# Patient Record
Sex: MALE | Race: WHITE | HISPANIC OR LATINO | URBAN - METROPOLITAN AREA
[De-identification: names, ages, dates, MRNs, and addresses within clinical notes are randomized per-mention and may not be internally consistent; named-entity substitution may affect disease eponyms.]

---

## 2020-02-16 ENCOUNTER — APPOINTMENT (OUTPATIENT)
Dept: RADIOLOGY | Facility: MEDICAL CENTER | Age: 20
End: 2020-02-16
Attending: EMERGENCY MEDICINE
Payer: COMMERCIAL

## 2020-02-16 ENCOUNTER — HOSPITAL ENCOUNTER (EMERGENCY)
Facility: MEDICAL CENTER | Age: 20
End: 2020-02-16
Attending: EMERGENCY MEDICINE
Payer: COMMERCIAL

## 2020-02-16 VITALS
TEMPERATURE: 98 F | BODY MASS INDEX: 25.95 KG/M2 | OXYGEN SATURATION: 100 % | HEART RATE: 81 BPM | DIASTOLIC BLOOD PRESSURE: 72 MMHG | SYSTOLIC BLOOD PRESSURE: 134 MMHG | HEIGHT: 67 IN | WEIGHT: 165.34 LBS | RESPIRATION RATE: 20 BRPM

## 2020-02-16 DIAGNOSIS — F10.929 ALCOHOLIC INTOXICATION WITH COMPLICATION (HCC): ICD-10-CM

## 2020-02-16 DIAGNOSIS — S01.81XA FACIAL LACERATION, INITIAL ENCOUNTER: ICD-10-CM
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
## 2020-02-16 DIAGNOSIS — S09.90XA CLOSED HEAD INJURY, INITIAL ENCOUNTER: ICD-10-CM

## 2020-02-16 LAB
ABO + RH BLD: NORMAL
ABO GROUP BLD: NORMAL
ALBUMIN SERPL BCP-MCNC: 4.5 G/DL (ref 3.2–4.9)
ALBUMIN/GLOB SERPL: 1.5 G/DL
ALP SERPL-CCNC: 51 U/L (ref 30–99)
ALT SERPL-CCNC: 13 U/L (ref 2–50)
ANION GAP SERPL CALC-SCNC: 12 MMOL/L (ref 0–11.9)
APTT PPP: 21.5 SEC (ref 24.7–36)
AST SERPL-CCNC: 21 U/L (ref 12–45)
BILIRUB SERPL-MCNC: 0.4 MG/DL (ref 0.1–1.5)
BLD GP AB SCN SERPL QL: NORMAL
BUN SERPL-MCNC: 10 MG/DL (ref 8–22)
CALCIUM SERPL-MCNC: 9.4 MG/DL (ref 8.5–10.5)
CFT BLD TEG: 4.3 MIN (ref 5–10)
CHLORIDE SERPL-SCNC: 107 MMOL/L (ref 96–112)
CLOT ANGLE BLD TEG: 66.7 DEGREES (ref 53–72)
CLOT LYSIS 30M P MA LENFR BLD TEG: 0 % (ref 0–8)
CO2 SERPL-SCNC: 24 MMOL/L (ref 20–33)
CREAT SERPL-MCNC: 0.99 MG/DL (ref 0.5–1.4)
CT.EXTRINSIC BLD ROTEM: 1.8 MIN (ref 1–3)
ERYTHROCYTE [DISTWIDTH] IN BLOOD BY AUTOMATED COUNT: 43.8 FL (ref 35.9–50)
ETHANOL BLD-MCNC: 0.15 G/DL
GLOBULIN SER CALC-MCNC: 3.1 G/DL (ref 1.9–3.5)
GLUCOSE SERPL-MCNC: 122 MG/DL (ref 65–99)
HCT VFR BLD AUTO: 46.1 % (ref 42–52)
HGB BLD-MCNC: 15.5 G/DL (ref 14–18)
INR PPP: 0.96 (ref 0.87–1.13)
LACTATE BLD-SCNC: 1.9 MMOL/L (ref 0.5–2)
LACTATE BLD-SCNC: 4.6 MMOL/L (ref 0.5–2)
MCF BLD TEG: 64.2 MM (ref 50–70)
MCH RBC QN AUTO: 32.1 PG (ref 27–33)
MCHC RBC AUTO-ENTMCNC: 33.6 G/DL (ref 33.7–35.3)
MCV RBC AUTO: 95.4 FL (ref 81.4–97.8)
PA AA BLD-ACNC: 65.8 %
PA ADP BLD-ACNC: 64.8 %
PLATELET # BLD AUTO: 238 K/UL (ref 164–446)
PMV BLD AUTO: 9.8 FL (ref 9–12.9)
POTASSIUM SERPL-SCNC: 3.8 MMOL/L (ref 3.6–5.5)
PROT SERPL-MCNC: 7.6 G/DL (ref 6–8.2)
PROTHROMBIN TIME: 13 SEC (ref 12–14.6)
RBC # BLD AUTO: 4.83 M/UL (ref 4.7–6.1)
RH BLD: NORMAL
SODIUM SERPL-SCNC: 143 MMOL/L (ref 135–145)
TEG ALGORITHM TGALG: ABNORMAL
WBC # BLD AUTO: 8.4 K/UL (ref 4.8–10.8)

## 2020-02-16 PROCEDURE — 85384 FIBRINOGEN ACTIVITY: CPT

## 2020-02-16 PROCEDURE — 80307 DRUG TEST PRSMV CHEM ANLYZR: CPT

## 2020-02-16 PROCEDURE — 85610 PROTHROMBIN TIME: CPT

## 2020-02-16 PROCEDURE — 99285 EMERGENCY DEPT VISIT HI MDM: CPT

## 2020-02-16 PROCEDURE — 80053 COMPREHEN METABOLIC PANEL: CPT

## 2020-02-16 PROCEDURE — 305308 HCHG STAPLER,SKIN,DISP.

## 2020-02-16 PROCEDURE — 90715 TDAP VACCINE 7 YRS/> IM: CPT | Performed by: EMERGENCY MEDICINE

## 2020-02-16 PROCEDURE — 85730 THROMBOPLASTIN TIME PARTIAL: CPT

## 2020-02-16 PROCEDURE — 90471 IMMUNIZATION ADMIN: CPT

## 2020-02-16 PROCEDURE — 72128 CT CHEST SPINE W/O DYE: CPT

## 2020-02-16 PROCEDURE — 86901 BLOOD TYPING SEROLOGIC RH(D): CPT

## 2020-02-16 PROCEDURE — 70486 CT MAXILLOFACIAL W/O DYE: CPT

## 2020-02-16 PROCEDURE — 700111 HCHG RX REV CODE 636 W/ 250 OVERRIDE (IP): Performed by: EMERGENCY MEDICINE

## 2020-02-16 PROCEDURE — 700102 HCHG RX REV CODE 250 W/ 637 OVERRIDE(OP): Performed by: EMERGENCY MEDICINE

## 2020-02-16 PROCEDURE — 86850 RBC ANTIBODY SCREEN: CPT

## 2020-02-16 PROCEDURE — 304999 HCHG REPAIR-SIMPLE/INTERMED LEVEL 1

## 2020-02-16 PROCEDURE — 700101 HCHG RX REV CODE 250

## 2020-02-16 PROCEDURE — 72170 X-RAY EXAM OF PELVIS: CPT

## 2020-02-16 PROCEDURE — 83605 ASSAY OF LACTIC ACID: CPT

## 2020-02-16 PROCEDURE — 303747 HCHG EXTRA SUTURE

## 2020-02-16 PROCEDURE — 72131 CT LUMBAR SPINE W/O DYE: CPT

## 2020-02-16 PROCEDURE — 85347 COAGULATION TIME ACTIVATED: CPT

## 2020-02-16 PROCEDURE — 85027 COMPLETE CBC AUTOMATED: CPT

## 2020-02-16 PROCEDURE — 86900 BLOOD TYPING SEROLOGIC ABO: CPT

## 2020-02-16 PROCEDURE — 72125 CT NECK SPINE W/O DYE: CPT

## 2020-02-16 PROCEDURE — 70450 CT HEAD/BRAIN W/O DYE: CPT

## 2020-02-16 PROCEDURE — 71045 X-RAY EXAM CHEST 1 VIEW: CPT

## 2020-02-16 PROCEDURE — 700105 HCHG RX REV CODE 258: Performed by: EMERGENCY MEDICINE

## 2020-02-16 PROCEDURE — 74177 CT ABD & PELVIS W/CONTRAST: CPT

## 2020-02-16 PROCEDURE — 700117 HCHG RX CONTRAST REV CODE 255: Performed by: EMERGENCY MEDICINE

## 2020-02-16 PROCEDURE — A9270 NON-COVERED ITEM OR SERVICE: HCPCS | Performed by: EMERGENCY MEDICINE

## 2020-02-16 PROCEDURE — 700111 HCHG RX REV CODE 636 W/ 250 OVERRIDE (IP)

## 2020-02-16 PROCEDURE — 305948 HCHG GREEN TRAUMA ACT PRE-NOTIFY NO CC

## 2020-02-16 PROCEDURE — 85576 BLOOD PLATELET AGGREGATION: CPT

## 2020-02-16 RX ORDER — LIDOCAINE HYDROCHLORIDE 10 MG/ML
20 INJECTION, SOLUTION INFILTRATION; PERINEURAL ONCE
Status: COMPLETED | OUTPATIENT
Start: 2020-02-16 | End: 2020-02-16

## 2020-02-16 RX ORDER — SODIUM CHLORIDE, SODIUM LACTATE, POTASSIUM CHLORIDE, CALCIUM CHLORIDE 600; 310; 30; 20 MG/100ML; MG/100ML; MG/100ML; MG/100ML
2000 INJECTION, SOLUTION INTRAVENOUS ONCE
Status: COMPLETED | OUTPATIENT
Start: 2020-02-16 | End: 2020-02-16

## 2020-02-16 RX ORDER — LIDOCAINE HYDROCHLORIDE 10 MG/ML
INJECTION, SOLUTION INFILTRATION; PERINEURAL
Status: COMPLETED
Start: 2020-02-16 | End: 2020-02-16

## 2020-02-16 RX ORDER — HYDROCODONE BITARTRATE AND ACETAMINOPHEN 5; 325 MG/1; MG/1
1 TABLET ORAL EVERY 6 HOURS PRN
Qty: 7 TAB | Refills: 0 | Status: SHIPPED | OUTPATIENT
Start: 2020-02-16 | End: 2020-02-19

## 2020-02-16 RX ORDER — CEPHALEXIN 500 MG/1
500 CAPSULE ORAL ONCE
Status: COMPLETED | OUTPATIENT
Start: 2020-02-16 | End: 2020-02-16

## 2020-02-16 RX ORDER — CEPHALEXIN 500 MG/1
500 CAPSULE ORAL 4 TIMES DAILY
Qty: 28 CAP | Refills: 0 | Status: SHIPPED | OUTPATIENT
Start: 2020-02-16

## 2020-02-16 RX ADMIN — FENTANYL CITRATE 100 MCG: 0.05 INJECTION, SOLUTION INTRAMUSCULAR; INTRAVENOUS at 05:48

## 2020-02-16 RX ADMIN — LIDOCAINE HYDROCHLORIDE 20 ML: 10 INJECTION, SOLUTION INFILTRATION; PERINEURAL at 05:00

## 2020-02-16 RX ADMIN — CEPHALEXIN 500 MG: 500 CAPSULE ORAL at 06:59

## 2020-02-16 RX ADMIN — CLOSTRIDIUM TETANI TOXOID ANTIGEN (FORMALDEHYDE INACTIVATED), CORYNEBACTERIUM DIPHTHERIAE TOXOID ANTIGEN (FORMALDEHYDE INACTIVATED), BORDETELLA PERTUSSIS TOXOID ANTIGEN (GLUTARALDEHYDE INACTIVATED), BORDETELLA PERTUSSIS FILAMENTOUS HEMAGGLUTININ ANTIGEN (FORMALDEHYDE INACTIVATED), BORDETELLA PERTUSSIS PERTACTIN ANTIGEN, AND BORDETELLA PERTUSSIS FIMBRIAE 2/3 ANTIGEN 0.5 ML: 5; 2; 2.5; 5; 3; 5 INJECTION, SUSPENSION INTRAMUSCULAR at 04:46

## 2020-02-16 RX ADMIN — SODIUM CHLORIDE, POTASSIUM CHLORIDE, SODIUM LACTATE AND CALCIUM CHLORIDE 2000 ML: 600; 310; 30; 20 INJECTION, SOLUTION INTRAVENOUS at 05:00

## 2020-02-16 RX ADMIN — IOHEXOL 100 ML: 350 INJECTION, SOLUTION INTRAVENOUS at 04:07
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
## 2020-02-16 NOTE — ED NOTES
Pt cleaned and provided with fresh gown. Pt has numerous R sided facial Lacs to Chin, nose, cheek, and forehead

## 2020-02-16 NOTE — ED NOTES
Pt cleared for discharge. AVS and RX instructions given.  Pt understands follow up and will follow up as directed. Pt ambulatory to lobby to return home

## 2021-08-16 ENCOUNTER — APPOINTMENT (OUTPATIENT)
Dept: RADIOLOGY | Facility: MEDICAL CENTER | Age: 21
DRG: 957 | End: 2021-08-16
Attending: SURGERY

## 2021-08-16 ENCOUNTER — APPOINTMENT (OUTPATIENT)
Dept: RADIOLOGY | Facility: MEDICAL CENTER | Age: 21
DRG: 957 | End: 2021-08-16
Attending: PHYSICIAN ASSISTANT

## 2021-08-16 ENCOUNTER — HOSPITAL ENCOUNTER (INPATIENT)
Facility: MEDICAL CENTER | Age: 21
LOS: 8 days | DRG: 957 | End: 2021-08-24
Attending: SURGERY | Admitting: SURGERY
Payer: COMMERCIAL

## 2021-08-16 ENCOUNTER — ANESTHESIA (OUTPATIENT)
Dept: SURGERY | Facility: MEDICAL CENTER | Age: 21
DRG: 957 | End: 2021-08-16

## 2021-08-16 ENCOUNTER — APPOINTMENT (OUTPATIENT)
Dept: RADIOLOGY | Facility: MEDICAL CENTER | Age: 21
DRG: 957 | End: 2021-08-16
Attending: ORTHOPAEDIC SURGERY

## 2021-08-16 ENCOUNTER — ANESTHESIA EVENT (OUTPATIENT)
Dept: SURGERY | Facility: MEDICAL CENTER | Age: 21
DRG: 957 | End: 2021-08-16

## 2021-08-16 ENCOUNTER — APPOINTMENT (OUTPATIENT)
Dept: RADIOLOGY | Facility: MEDICAL CENTER | Age: 21
DRG: 957 | End: 2021-08-16
Attending: NURSE PRACTITIONER

## 2021-08-16 DIAGNOSIS — S32.810A MULTIPLE CLOSED FRACTURES OF PELVIS WITH DISRUPTION OF PELVIC RING, INITIAL ENCOUNTER (HCC): ICD-10-CM

## 2021-08-16 PROBLEM — S73.001A: Status: ACTIVE | Noted: 2021-08-16

## 2021-08-16 PROBLEM — S82.201B TIBIA/FIBULA FRACTURE, RIGHT, OPEN TYPE I OR II, INITIAL ENCOUNTER: Status: ACTIVE | Noted: 2021-08-16

## 2021-08-16 PROBLEM — S72.002A CLOSED FRACTURE OF NECK OF LEFT FEMUR (HCC): Status: ACTIVE | Noted: 2021-08-16

## 2021-08-16 PROBLEM — T14.90XA TRAUMA: Status: ACTIVE | Noted: 2021-08-16

## 2021-08-16 PROBLEM — F10.929 ACUTE ALCOHOL INTOXICATION (HCC): Status: ACTIVE | Noted: 2021-08-16

## 2021-08-16 PROBLEM — Z11.52 ENCOUNTER FOR SCREENING FOR COVID-19: Status: ACTIVE | Noted: 2021-08-16

## 2021-08-16 PROBLEM — S82.401B TIBIA/FIBULA FRACTURE, RIGHT, OPEN TYPE I OR II, INITIAL ENCOUNTER: Status: ACTIVE | Noted: 2021-08-16

## 2021-08-16 PROBLEM — S73.004A HIP DISLOCATION, RIGHT, INITIAL ENCOUNTER (HCC): Status: ACTIVE | Noted: 2021-08-16

## 2021-08-16 PROBLEM — Z53.09 CONTRAINDICATION TO DEEP VEIN THROMBOSIS (DVT) PROPHYLAXIS: Status: ACTIVE | Noted: 2021-08-16

## 2021-08-16 PROBLEM — M25.461 KNEE EFFUSION, RIGHT: Status: ACTIVE | Noted: 2021-08-16

## 2021-08-16 PROBLEM — J69.0 ASPIRATION PNEUMONIA (HCC): Status: ACTIVE | Noted: 2021-08-16

## 2021-08-16 PROBLEM — J96.90 RESPIRATORY FAILURE FOLLOWING TRAUMA (HCC): Status: ACTIVE | Noted: 2021-08-16

## 2021-08-16 LAB
ABO GROUP BLD: NORMAL
ALBUMIN SERPL BCP-MCNC: 4.1 G/DL (ref 3.2–4.9)
ALBUMIN/GLOB SERPL: 1.6 G/DL
ALP SERPL-CCNC: 63 U/L (ref 30–99)
ALT SERPL-CCNC: 52 U/L (ref 2–50)
ANION GAP SERPL CALC-SCNC: 13 MMOL/L (ref 7–16)
APTT PPP: 24.8 SEC (ref 24.7–36)
AST SERPL-CCNC: 46 U/L (ref 12–45)
BASE EXCESS BLDA CALC-SCNC: -4 MMOL/L (ref -4–3)
BILIRUB SERPL-MCNC: 0.3 MG/DL (ref 0.1–1.5)
BLD GP AB SCN SERPL QL: NORMAL
BODY TEMPERATURE: ABNORMAL DEGREES
BREATHS SETTING VENT: 16
BUN SERPL-MCNC: 7 MG/DL (ref 8–22)
CALCIUM SERPL-MCNC: 8.5 MG/DL (ref 8.5–10.5)
CHLORIDE SERPL-SCNC: 111 MMOL/L (ref 96–112)
CO2 BLDA-SCNC: 24 MMOL/L (ref 20–33)
CO2 SERPL-SCNC: 24 MMOL/L (ref 20–33)
CREAT SERPL-MCNC: 1.22 MG/DL (ref 0.5–1.4)
DELSYS IDSYS: ABNORMAL
END TIDAL CARBON DIOXIDE IECO2: 40 MMHG
ERYTHROCYTE [DISTWIDTH] IN BLOOD BY AUTOMATED COUNT: 45 FL (ref 35.9–50)
ETHANOL BLD-MCNC: 263.1 MG/DL (ref 0–10)
GLOBULIN SER CALC-MCNC: 2.6 G/DL (ref 1.9–3.5)
GLUCOSE SERPL-MCNC: 155 MG/DL (ref 65–99)
HCO3 BLDA-SCNC: 22.6 MMOL/L (ref 17–25)
HCT VFR BLD AUTO: 45.3 % (ref 42–52)
HGB BLD-MCNC: 15 G/DL (ref 14–18)
HOROWITZ INDEX BLDA+IHG-RTO: 364 MM[HG]
INR PPP: 1.17 (ref 0.87–1.13)
MAGNESIUM SERPL-MCNC: 2 MG/DL (ref 1.5–2.5)
MCH RBC QN AUTO: 31.6 PG (ref 27–33)
MCHC RBC AUTO-ENTMCNC: 33.1 G/DL (ref 33.7–35.3)
MCV RBC AUTO: 95.6 FL (ref 81.4–97.8)
MODE IMODE: ABNORMAL
O2/TOTAL GAS SETTING VFR VENT: 50 %
PCO2 BLDA: 47.9 MMHG (ref 26–37)
PCO2 TEMP ADJ BLDA: 45.6 MMHG (ref 26–37)
PEEP END EXPIRATORY PRESSURE IPEEP: 8 CMH20
PH BLDA: 7.28 [PH] (ref 7.4–7.5)
PH TEMP ADJ BLDA: 7.3 [PH] (ref 7.4–7.5)
PHOSPHATE SERPL-MCNC: 3.3 MG/DL (ref 2.5–4.5)
PLATELET # BLD AUTO: 246 K/UL (ref 164–446)
PMV BLD AUTO: 9.9 FL (ref 9–12.9)
PO2 BLDA: 182 MMHG (ref 64–87)
PO2 TEMP ADJ BLDA: 176 MMHG (ref 64–87)
POTASSIUM SERPL-SCNC: 3.7 MMOL/L (ref 3.6–5.5)
PROT SERPL-MCNC: 6.7 G/DL (ref 6–8.2)
PROTHROMBIN TIME: 14.6 SEC (ref 12–14.6)
RBC # BLD AUTO: 4.74 M/UL (ref 4.7–6.1)
RH BLD: NORMAL
SAO2 % BLDA: 99 % (ref 93–99)
SARS-COV+SARS-COV-2 AG RESP QL IA.RAPID: NOTDETECTED
SODIUM SERPL-SCNC: 148 MMOL/L (ref 135–145)
SPECIMEN DRAWN FROM PATIENT: ABNORMAL
SPECIMEN SOURCE: NORMAL
TIDAL VOLUME IVT: 430 ML
TRIGL SERPL-MCNC: 177 MG/DL (ref 0–149)
TRIGL SERPL-MCNC: 97 MG/DL (ref 0–149)
WBC # BLD AUTO: 8.8 K/UL (ref 4.8–10.8)

## 2021-08-16 PROCEDURE — 76705 ECHO EXAM OF ABDOMEN: CPT

## 2021-08-16 PROCEDURE — 0QS404Z REPOSITION RIGHT ACETABULUM WITH INTERNAL FIXATION DEVICE, OPEN APPROACH: ICD-10-PCS | Performed by: ORTHOPAEDIC SURGERY

## 2021-08-16 PROCEDURE — 700105 HCHG RX REV CODE 258: Performed by: ANESTHESIOLOGY

## 2021-08-16 PROCEDURE — 36600 WITHDRAWAL OF ARTERIAL BLOOD: CPT

## 2021-08-16 PROCEDURE — 160009 HCHG ANES TIME/MIN: Performed by: ORTHOPAEDIC SURGERY

## 2021-08-16 PROCEDURE — 71260 CT THORAX DX C+: CPT

## 2021-08-16 PROCEDURE — 700111 HCHG RX REV CODE 636 W/ 250 OVERRIDE (IP): Performed by: SURGERY

## 2021-08-16 PROCEDURE — 86901 BLOOD TYPING SEROLOGIC RH(D): CPT

## 2021-08-16 PROCEDURE — 700101 HCHG RX REV CODE 250: Performed by: PHARMACIST

## 2021-08-16 PROCEDURE — 83735 ASSAY OF MAGNESIUM: CPT

## 2021-08-16 PROCEDURE — 700101 HCHG RX REV CODE 250: Performed by: ORTHOPAEDIC SURGERY

## 2021-08-16 PROCEDURE — 72170 X-RAY EXAM OF PELVIS: CPT

## 2021-08-16 PROCEDURE — 700111 HCHG RX REV CODE 636 W/ 250 OVERRIDE (IP): Performed by: ORTHOPAEDIC SURGERY

## 2021-08-16 PROCEDURE — 72125 CT NECK SPINE W/O DYE: CPT

## 2021-08-16 PROCEDURE — 0BH18EZ INSERTION OF ENDOTRACHEAL AIRWAY INTO TRACHEA, VIA NATURAL OR ARTIFICIAL OPENING ENDOSCOPIC: ICD-10-PCS | Performed by: EMERGENCY MEDICINE

## 2021-08-16 PROCEDURE — 500367 HCHG DRAIN KIT, HEMOVAC: Performed by: ORTHOPAEDIC SURGERY

## 2021-08-16 PROCEDURE — 99291 CRITICAL CARE FIRST HOUR: CPT

## 2021-08-16 PROCEDURE — 160042 HCHG SURGERY MINUTES - EA ADDL 1 MIN LEVEL 5: Performed by: ORTHOPAEDIC SURGERY

## 2021-08-16 PROCEDURE — 160048 HCHG OR STATISTICAL LEVEL 1-5: Performed by: ORTHOPAEDIC SURGERY

## 2021-08-16 PROCEDURE — 110371 HCHG SHELL REV 272: Performed by: ORTHOPAEDIC SURGERY

## 2021-08-16 PROCEDURE — 700111 HCHG RX REV CODE 636 W/ 250 OVERRIDE (IP): Performed by: PHARMACIST

## 2021-08-16 PROCEDURE — 94002 VENT MGMT INPAT INIT DAY: CPT

## 2021-08-16 PROCEDURE — 700102 HCHG RX REV CODE 250 W/ 637 OVERRIDE(OP): Performed by: SURGERY

## 2021-08-16 PROCEDURE — 86850 RBC ANTIBODY SCREEN: CPT

## 2021-08-16 PROCEDURE — A9270 NON-COVERED ITEM OR SERVICE: HCPCS | Performed by: SURGERY

## 2021-08-16 PROCEDURE — A6454 SELF-ADHER BAND W>=3" <5"/YD: HCPCS | Performed by: ORTHOPAEDIC SURGERY

## 2021-08-16 PROCEDURE — 72192 CT PELVIS W/O DYE: CPT

## 2021-08-16 PROCEDURE — 72128 CT CHEST SPINE W/O DYE: CPT

## 2021-08-16 PROCEDURE — 160031 HCHG SURGERY MINUTES - 1ST 30 MINS LEVEL 5: Performed by: ORTHOPAEDIC SURGERY

## 2021-08-16 PROCEDURE — 73706 CT ANGIO LWR EXTR W/O&W/DYE: CPT | Mod: RT

## 2021-08-16 PROCEDURE — 73721 MRI JNT OF LWR EXTRE W/O DYE: CPT | Mod: RT

## 2021-08-16 PROCEDURE — 80053 COMPREHEN METABOLIC PANEL: CPT

## 2021-08-16 PROCEDURE — 84478 ASSAY OF TRIGLYCERIDES: CPT | Mod: 91

## 2021-08-16 PROCEDURE — 5A1935Z RESPIRATORY VENTILATION, LESS THAN 24 CONSECUTIVE HOURS: ICD-10-PCS | Performed by: EMERGENCY MEDICINE

## 2021-08-16 PROCEDURE — 73721 MRI JNT OF LWR EXTRE W/O DYE: CPT | Mod: LT

## 2021-08-16 PROCEDURE — 73590 X-RAY EXAM OF LOWER LEG: CPT | Mod: RT

## 2021-08-16 PROCEDURE — 94799 UNLISTED PULMONARY SVC/PX: CPT

## 2021-08-16 PROCEDURE — 700105 HCHG RX REV CODE 258: Performed by: SURGERY

## 2021-08-16 PROCEDURE — 0YQFXZZ REPAIR RIGHT KNEE REGION, EXTERNAL APPROACH: ICD-10-PCS | Performed by: ORTHOPAEDIC SURGERY

## 2021-08-16 PROCEDURE — HZ2ZZZZ DETOXIFICATION SERVICES FOR SUBSTANCE ABUSE TREATMENT: ICD-10-PCS | Performed by: SURGERY

## 2021-08-16 PROCEDURE — C1713 ANCHOR/SCREW BN/BN,TIS/BN: HCPCS | Performed by: ORTHOPAEDIC SURGERY

## 2021-08-16 PROCEDURE — 700117 HCHG RX CONTRAST REV CODE 255: Performed by: SURGERY

## 2021-08-16 PROCEDURE — 71045 X-RAY EXAM CHEST 1 VIEW: CPT

## 2021-08-16 PROCEDURE — 770022 HCHG ROOM/CARE - ICU (200)

## 2021-08-16 PROCEDURE — 85610 PROTHROMBIN TIME: CPT

## 2021-08-16 PROCEDURE — 72131 CT LUMBAR SPINE W/O DYE: CPT

## 2021-08-16 PROCEDURE — 700101 HCHG RX REV CODE 250: Performed by: ANESTHESIOLOGY

## 2021-08-16 PROCEDURE — 87426 SARSCOV CORONAVIRUS AG IA: CPT

## 2021-08-16 PROCEDURE — 86900 BLOOD TYPING SEROLOGIC ABO: CPT

## 2021-08-16 PROCEDURE — 82803 BLOOD GASES ANY COMBINATION: CPT

## 2021-08-16 PROCEDURE — 82077 ASSAY SPEC XCP UR&BREATH IA: CPT

## 2021-08-16 PROCEDURE — 85730 THROMBOPLASTIN TIME PARTIAL: CPT

## 2021-08-16 PROCEDURE — 99291 CRITICAL CARE FIRST HOUR: CPT | Performed by: SURGERY

## 2021-08-16 PROCEDURE — 700111 HCHG RX REV CODE 636 W/ 250 OVERRIDE (IP): Performed by: ANESTHESIOLOGY

## 2021-08-16 PROCEDURE — 85027 COMPLETE CBC AUTOMATED: CPT

## 2021-08-16 PROCEDURE — 84100 ASSAY OF PHOSPHORUS: CPT

## 2021-08-16 PROCEDURE — 501838 HCHG SUTURE GENERAL: Performed by: ORTHOPAEDIC SURGERY

## 2021-08-16 PROCEDURE — 70450 CT HEAD/BRAIN W/O DYE: CPT

## 2021-08-16 PROCEDURE — G0390 TRAUMA RESPONS W/HOSP CRITI: HCPCS

## 2021-08-16 PROCEDURE — 502578 HCHG PACK, TOTAL HIP: Performed by: ORTHOPAEDIC SURGERY

## 2021-08-16 PROCEDURE — 0SC90ZZ EXTIRPATION OF MATTER FROM RIGHT HIP JOINT, OPEN APPROACH: ICD-10-PCS | Performed by: ORTHOPAEDIC SURGERY

## 2021-08-16 PROCEDURE — 31500 INSERT EMERGENCY AIRWAY: CPT

## 2021-08-16 DEVICE — SCREW CORT 3.5X32MM ST T15 - (LPPELVX4=4): Type: IMPLANTABLE DEVICE | Site: HIP | Status: FUNCTIONAL

## 2021-08-16 DEVICE — IMPLANTABLE DEVICE: Type: IMPLANTABLE DEVICE | Site: HIP | Status: FUNCTIONAL

## 2021-08-16 DEVICE — WASHER ASIF 7.0 SM 219.98 (7TX6+4TX4=58): Type: IMPLANTABLE DEVICE | Site: HIP | Status: FUNCTIONAL

## 2021-08-16 DEVICE — SCREW CORT 3.5X30MM ST T15 - (LPPELVX4=4): Type: IMPLANTABLE DEVICE | Site: HIP | Status: FUNCTIONAL

## 2021-08-16 DEVICE — GRAFT BONE CANCELLOUS FREEZE DRIED CHIPS ALLOSOURCE 15CC (1EA): Type: IMPLANTABLE DEVICE | Site: HIP | Status: FUNCTIONAL

## 2021-08-16 DEVICE — PLATE 3.5 RECON 7-H LP - (LPPELX2=2): Type: IMPLANTABLE DEVICE | Site: HIP | Status: FUNCTIONAL

## 2021-08-16 RX ORDER — OXYCODONE HYDROCHLORIDE 10 MG/1
10 TABLET ORAL
Status: DISCONTINUED | OUTPATIENT
Start: 2021-08-16 | End: 2021-08-16

## 2021-08-16 RX ORDER — KETAMINE HYDROCHLORIDE 50 MG/ML
INJECTION, SOLUTION INTRAMUSCULAR; INTRAVENOUS PRN
Status: DISCONTINUED | OUTPATIENT
Start: 2021-08-16 | End: 2021-08-16 | Stop reason: SURG

## 2021-08-16 RX ORDER — DOCUSATE SODIUM 100 MG/1
100 CAPSULE, LIQUID FILLED ORAL 2 TIMES DAILY
Status: DISCONTINUED | OUTPATIENT
Start: 2021-08-16 | End: 2021-08-24 | Stop reason: HOSPADM

## 2021-08-16 RX ORDER — OXYCODONE HYDROCHLORIDE 5 MG/1
5 TABLET ORAL
Status: DISCONTINUED | OUTPATIENT
Start: 2021-08-16 | End: 2021-08-16

## 2021-08-16 RX ORDER — POLYETHYLENE GLYCOL 3350 17 G/17G
1 POWDER, FOR SOLUTION ORAL 2 TIMES DAILY
Status: DISCONTINUED | OUTPATIENT
Start: 2021-08-16 | End: 2021-08-24 | Stop reason: HOSPADM

## 2021-08-16 RX ORDER — ROCURONIUM BROMIDE 10 MG/ML
INJECTION, SOLUTION INTRAVENOUS
Status: COMPLETED | OUTPATIENT
Start: 2021-08-16 | End: 2021-08-16

## 2021-08-16 RX ORDER — AMOXICILLIN 250 MG
1 CAPSULE ORAL NIGHTLY
Status: DISCONTINUED | OUTPATIENT
Start: 2021-08-16 | End: 2021-08-24 | Stop reason: HOSPADM

## 2021-08-16 RX ORDER — CEFAZOLIN SODIUM 2 G/100ML
2 INJECTION, SOLUTION INTRAVENOUS EVERY 8 HOURS
Status: COMPLETED | OUTPATIENT
Start: 2021-08-16 | End: 2021-08-18

## 2021-08-16 RX ORDER — FAMOTIDINE 20 MG/1
20 TABLET, FILM COATED ORAL 2 TIMES DAILY
Status: DISCONTINUED | OUTPATIENT
Start: 2021-08-16 | End: 2021-08-16

## 2021-08-16 RX ORDER — MORPHINE SULFATE 4 MG/ML
1-4 INJECTION, SOLUTION INTRAMUSCULAR; INTRAVENOUS
Status: DISCONTINUED | OUTPATIENT
Start: 2021-08-16 | End: 2021-08-16

## 2021-08-16 RX ORDER — SODIUM CHLORIDE, SODIUM LACTATE, POTASSIUM CHLORIDE, CALCIUM CHLORIDE 600; 310; 30; 20 MG/100ML; MG/100ML; MG/100ML; MG/100ML
INJECTION, SOLUTION INTRAVENOUS
Status: DISCONTINUED | OUTPATIENT
Start: 2021-08-16 | End: 2021-08-16 | Stop reason: SURG

## 2021-08-16 RX ORDER — TRANEXAMIC ACID 100 MG/ML
INJECTION, SOLUTION INTRAVENOUS PRN
Status: DISCONTINUED | OUTPATIENT
Start: 2021-08-16 | End: 2021-08-16 | Stop reason: SURG

## 2021-08-16 RX ORDER — MAGNESIUM HYDROXIDE 1200 MG/15ML
LIQUID ORAL
Status: COMPLETED | OUTPATIENT
Start: 2021-08-16 | End: 2021-08-16

## 2021-08-16 RX ORDER — CEFAZOLIN SODIUM 1 G/3ML
INJECTION, POWDER, FOR SOLUTION INTRAMUSCULAR; INTRAVENOUS PRN
Status: DISCONTINUED | OUTPATIENT
Start: 2021-08-16 | End: 2021-08-16 | Stop reason: SURG

## 2021-08-16 RX ORDER — ACETAMINOPHEN 325 MG/1
650 TABLET ORAL EVERY 6 HOURS
Status: DISCONTINUED | OUTPATIENT
Start: 2021-08-16 | End: 2021-08-17

## 2021-08-16 RX ORDER — AMOXICILLIN 250 MG
1 CAPSULE ORAL
Status: DISCONTINUED | OUTPATIENT
Start: 2021-08-16 | End: 2021-08-24 | Stop reason: HOSPADM

## 2021-08-16 RX ORDER — MIDAZOLAM HYDROCHLORIDE 1 MG/ML
INJECTION INTRAMUSCULAR; INTRAVENOUS
Status: ACTIVE
Start: 2021-08-16 | End: 2021-08-16

## 2021-08-16 RX ORDER — MORPHINE SULFATE 4 MG/ML
1-4 INJECTION, SOLUTION INTRAMUSCULAR; INTRAVENOUS
Status: DISCONTINUED | OUTPATIENT
Start: 2021-08-16 | End: 2021-08-20

## 2021-08-16 RX ORDER — ENEMA 19; 7 G/133ML; G/133ML
1 ENEMA RECTAL
Status: DISCONTINUED | OUTPATIENT
Start: 2021-08-16 | End: 2021-08-24 | Stop reason: HOSPADM

## 2021-08-16 RX ORDER — OXYCODONE HYDROCHLORIDE 5 MG/1
5 TABLET ORAL
Status: DISCONTINUED | OUTPATIENT
Start: 2021-08-16 | End: 2021-08-24 | Stop reason: HOSPADM

## 2021-08-16 RX ORDER — MIDAZOLAM HYDROCHLORIDE 1 MG/ML
INJECTION INTRAMUSCULAR; INTRAVENOUS PRN
Status: DISCONTINUED | OUTPATIENT
Start: 2021-08-16 | End: 2021-08-16 | Stop reason: SURG

## 2021-08-16 RX ORDER — ONDANSETRON 2 MG/ML
4 INJECTION INTRAMUSCULAR; INTRAVENOUS EVERY 4 HOURS PRN
Status: DISCONTINUED | OUTPATIENT
Start: 2021-08-16 | End: 2021-08-17

## 2021-08-16 RX ORDER — ROCURONIUM BROMIDE 10 MG/ML
INJECTION, SOLUTION INTRAVENOUS PRN
Status: DISCONTINUED | OUTPATIENT
Start: 2021-08-16 | End: 2021-08-16 | Stop reason: SURG

## 2021-08-16 RX ORDER — BISACODYL 10 MG
10 SUPPOSITORY, RECTAL RECTAL
Status: DISCONTINUED | OUTPATIENT
Start: 2021-08-16 | End: 2021-08-24 | Stop reason: HOSPADM

## 2021-08-16 RX ORDER — LORAZEPAM 1 MG/1
1 TABLET ORAL EVERY 4 HOURS PRN
Status: DISCONTINUED | OUTPATIENT
Start: 2021-08-16 | End: 2021-08-17

## 2021-08-16 RX ORDER — ACETAMINOPHEN 325 MG/1
650 TABLET ORAL EVERY 6 HOURS PRN
Status: DISCONTINUED | OUTPATIENT
Start: 2021-08-21 | End: 2021-08-17

## 2021-08-16 RX ORDER — ONDANSETRON 4 MG/1
4 TABLET, ORALLY DISINTEGRATING ORAL EVERY 4 HOURS PRN
Status: DISCONTINUED | OUTPATIENT
Start: 2021-08-16 | End: 2021-08-24 | Stop reason: HOSPADM

## 2021-08-16 RX ORDER — OXYCODONE HYDROCHLORIDE 10 MG/1
10 TABLET ORAL
Status: DISCONTINUED | OUTPATIENT
Start: 2021-08-16 | End: 2021-08-24 | Stop reason: HOSPADM

## 2021-08-16 RX ORDER — SODIUM CHLORIDE 9 MG/ML
INJECTION, SOLUTION INTRAVENOUS CONTINUOUS
Status: DISCONTINUED | OUTPATIENT
Start: 2021-08-16 | End: 2021-08-17

## 2021-08-16 RX ADMIN — FENTANYL CITRATE 100 MCG: 50 INJECTION, SOLUTION INTRAMUSCULAR; INTRAVENOUS at 11:24

## 2021-08-16 RX ADMIN — ROCURONIUM BROMIDE 50 MG: 10 INJECTION, SOLUTION INTRAVENOUS at 12:55

## 2021-08-16 RX ADMIN — IOHEXOL 100 ML: 350 INJECTION, SOLUTION INTRAVENOUS at 02:00

## 2021-08-16 RX ADMIN — TRANEXAMIC ACID 1000 MG: 100 INJECTION, SOLUTION INTRAVENOUS at 09:37

## 2021-08-16 RX ADMIN — CEFAZOLIN 2 G: 330 INJECTION, POWDER, FOR SOLUTION INTRAMUSCULAR; INTRAVENOUS at 09:22

## 2021-08-16 RX ADMIN — ROCURONIUM BROMIDE 50 MG: 10 INJECTION, SOLUTION INTRAVENOUS at 09:45

## 2021-08-16 RX ADMIN — ROCURONIUM BROMIDE 50 MG: 10 INJECTION, SOLUTION INTRAVENOUS at 09:10

## 2021-08-16 RX ADMIN — CEFAZOLIN SODIUM 2 G: 2 INJECTION, SOLUTION INTRAVENOUS at 20:14

## 2021-08-16 RX ADMIN — FENTANYL CITRATE 100 MCG: 50 INJECTION, SOLUTION INTRAMUSCULAR; INTRAVENOUS at 09:45

## 2021-08-16 RX ADMIN — CEFAZOLIN 2 G: 330 INJECTION, POWDER, FOR SOLUTION INTRAMUSCULAR; INTRAVENOUS at 13:15

## 2021-08-16 RX ADMIN — KETAMINE HYDROCHLORIDE 50 MG: 50 INJECTION INTRAMUSCULAR; INTRAVENOUS at 09:21

## 2021-08-16 RX ADMIN — ENOXAPARIN SODIUM 30 MG: 30 INJECTION SUBCUTANEOUS at 17:10

## 2021-08-16 RX ADMIN — ACETAMINOPHEN 650 MG: 325 TABLET, FILM COATED ORAL at 23:00

## 2021-08-16 RX ADMIN — KETAMINE HYDROCHLORIDE 25 MG: 50 INJECTION INTRAMUSCULAR; INTRAVENOUS at 12:55

## 2021-08-16 RX ADMIN — ROCURONIUM BROMIDE 50 MG: 10 INJECTION, SOLUTION INTRAVENOUS at 10:54

## 2021-08-16 RX ADMIN — FENTANYL CITRATE 100 MCG: 50 INJECTION, SOLUTION INTRAMUSCULAR; INTRAVENOUS at 10:54

## 2021-08-16 RX ADMIN — DOCUSATE SODIUM 50 MG AND SENNOSIDES 8.6 MG 1 TABLET: 8.6; 5 TABLET, FILM COATED ORAL at 20:14

## 2021-08-16 RX ADMIN — SODIUM CHLORIDE, POTASSIUM CHLORIDE, SODIUM LACTATE AND CALCIUM CHLORIDE: 600; 310; 30; 20 INJECTION, SOLUTION INTRAVENOUS at 13:05

## 2021-08-16 RX ADMIN — FENTANYL CITRATE 100 MCG: 50 INJECTION, SOLUTION INTRAMUSCULAR; INTRAVENOUS at 12:28

## 2021-08-16 RX ADMIN — MORPHINE SULFATE 4 MG: 4 INJECTION INTRAVENOUS at 08:55

## 2021-08-16 RX ADMIN — PROPOFOL 60 MCG/KG/MIN: 10 INJECTION, EMULSION INTRAVENOUS at 05:15

## 2021-08-16 RX ADMIN — PROPOFOL 75 MCG/KG/MIN: 10 INJECTION, EMULSION INTRAVENOUS at 08:15

## 2021-08-16 RX ADMIN — MIDAZOLAM HYDROCHLORIDE 2 MG: 1 INJECTION, SOLUTION INTRAMUSCULAR; INTRAVENOUS at 09:10

## 2021-08-16 RX ADMIN — DOCUSATE SODIUM 100 MG: 100 CAPSULE, LIQUID FILLED ORAL at 17:10

## 2021-08-16 RX ADMIN — KETAMINE HYDROCHLORIDE 25 MG: 50 INJECTION INTRAMUSCULAR; INTRAVENOUS at 10:29

## 2021-08-16 RX ADMIN — SUGAMMADEX 320 MG: 100 INJECTION, SOLUTION INTRAVENOUS at 13:30

## 2021-08-16 RX ADMIN — ONDANSETRON 4 MG: 2 INJECTION INTRAMUSCULAR; INTRAVENOUS at 14:50

## 2021-08-16 RX ADMIN — SODIUM CHLORIDE: 9 INJECTION, SOLUTION INTRAVENOUS at 01:35

## 2021-08-16 RX ADMIN — OXYCODONE HYDROCHLORIDE 10 MG: 10 TABLET ORAL at 22:59

## 2021-08-16 RX ADMIN — OXYCODONE HYDROCHLORIDE 10 MG: 10 TABLET ORAL at 19:52

## 2021-08-16 RX ADMIN — SODIUM CHLORIDE: 9 INJECTION, SOLUTION INTRAVENOUS at 14:34

## 2021-08-16 RX ADMIN — MORPHINE SULFATE 4 MG: 4 INJECTION INTRAVENOUS at 16:42

## 2021-08-16 RX ADMIN — MORPHINE SULFATE 2 MG: 4 INJECTION INTRAVENOUS at 02:15

## 2021-08-16 RX ADMIN — KETAMINE HYDROCHLORIDE 25 MG: 50 INJECTION INTRAMUSCULAR; INTRAVENOUS at 11:24

## 2021-08-16 RX ADMIN — FENTANYL CITRATE 100 MCG: 50 INJECTION, SOLUTION INTRAMUSCULAR; INTRAVENOUS at 11:40

## 2021-08-16 RX ADMIN — ROCURONIUM BROMIDE 100 MG: 10 INJECTION, SOLUTION INTRAVENOUS at 00:51

## 2021-08-16 RX ADMIN — KETAMINE HYDROCHLORIDE 25 MG: 50 INJECTION INTRAMUSCULAR; INTRAVENOUS at 12:28

## 2021-08-16 RX ADMIN — PROPOFOL 30 MCG/KG/MIN: 10 INJECTION, EMULSION INTRAVENOUS at 01:49

## 2021-08-16 RX ADMIN — ACETAMINOPHEN 650 MG: 325 TABLET, FILM COATED ORAL at 17:10

## 2021-08-16 RX ADMIN — LORAZEPAM 1 MG: 1 TABLET ORAL at 17:16

## 2021-08-16 RX ADMIN — FAMOTIDINE 20 MG: 10 INJECTION INTRAVENOUS at 05:13

## 2021-08-16 RX ADMIN — FENTANYL CITRATE 100 MCG: 50 INJECTION, SOLUTION INTRAMUSCULAR; INTRAVENOUS at 13:00

## 2021-08-16 RX ADMIN — MORPHINE SULFATE 2 MG: 4 INJECTION INTRAVENOUS at 15:25

## 2021-08-16 RX ADMIN — PROPOFOL 100 MG: 10 INJECTION, EMULSION INTRAVENOUS at 00:50

## 2021-08-16 ASSESSMENT — PAIN DESCRIPTION - PAIN TYPE
TYPE: ACUTE PAIN

## 2021-08-16 ASSESSMENT — PAIN SCALES - GENERAL: PAIN_LEVEL: 4
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
## 2021-08-16 NOTE — PROGRESS NOTES
Wallet and cell phone placed in specimen bag and labeled with pt ID sticker. Items placed in locked pt  drawer.
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
## 2021-08-16 NOTE — CARE PLAN
Vent day 1      Problem: Ventilation Defect:  Goal: Ability to achieve and maintain unassisted ventilation or tolerate decreased levels of ventilator support  Outcome: Progressing

## 2021-08-16 NOTE — OR NURSING
Pt to pre-op with ICU RN; report given to anesthesiologist. Parents at bedside. Consents signed. WHO checklist completed. Pt taken to OR with team.

## 2021-08-16 NOTE — ASSESSMENT & PLAN NOTE
Alveolar opacity in the medial aspect of the right upper lobe.  Supplemental oxygen to maintain O2 saturations greater than 95%.  Aggressive pulmonary hygiene. Serial chest radiographs.  8/16 CXR without consolidation.

## 2021-08-16 NOTE — RESPIRATORY CARE
Respiratory Trauma Red Note      Intubation -yes  Positive Color Change on EZCap? -yes  Number of attempts 1   Pt was intubated using glidescope, bilateral BS, color change, cxr   Airway ETT Oral 8.0-Secured At  (cm): 25 (08/16/21 0130)    ETT and pascual are secure. Cuff to MOV

## 2021-08-16 NOTE — ASSESSMENT & PLAN NOTE
Minimal fracture of the distal anterior aspect of the left femoral neck.  Non-operative management.   Weight bearing status - Nonweightbearing LLE.  Redd Villagomez MD. Orthopedic Surgeon. [facility].   Jorge Holt MD. Orthopedic Surgeon. [facility].

## 2021-08-16 NOTE — CARE PLAN
The patient is Watcher - Medium risk of patient condition declining or worsening         Progress made toward(s) clinical / shift goals:    Problem: Pain - Standard  Goal: Alleviation of pain or a reduction in pain to the patient’s comfort goal  Outcome: Progressing     Problem: Skin Integrity  Goal: Skin integrity is maintained or improved  Outcome: Progressing     Problem: Fall Risk  Goal: Patient will remain free from falls  Outcome: Progressing     Problem: Hemodynamics  Goal: Patient's hemodynamics, fluid balance and neurologic status will be stable or improve  Outcome: Progressing     Problem: Mechanical Ventilation  Goal: Safe management of artificial airway and ventilation  Outcome: Progressing       Patient is not progressing towards the following goals:      Problem: Knowledge Deficit - Standard  Goal: Patient and family/care givers will demonstrate understanding of plan of care, disease process/condition, diagnostic tests and medications  Outcome: Not Progressing

## 2021-08-16 NOTE — ANESTHESIA TIME REPORT
Anesthesia Start and Stop Event Times     Date Time Event    8/16/2021 0858 Ready for Procedure     0910 Anesthesia Start     1414 Anesthesia Stop        Responsible Staff  08/16/21    Name Role Begin End    Soni Alvarado M.D. Anesth 0910 1304    Yomi Conti M.D. Anesth 1304 1414        Preop Diagnosis (Free Text):  Pre-op Diagnosis     Right displaced acetabulum fracture dislocation         Preop Diagnosis (Codes):    Post op Diagnosis  Acetabular fracture (HCC)      Premium Reason  Non-Premium    Comments:

## 2021-08-16 NOTE — ASSESSMENT & PLAN NOTE
Posterior subluxation of the right femoral head.  Reduced at bedside in ICU.  Post reduction CT with successful reduction of the right femoral head posterior dislocation. Residual 10 x 5 mm fragment of bone within the right hip joint.  8/17  Right hip arthrotomy and removal of incarcerated osteochondral fragment from the hip joint.  Weight bearing status - Nonweightbearing RLE.  Redd Villagomez MD. Orthopedic Surgeon. [facility].   Jorge Holt MD. Orthopedic Surgeon. [facility].

## 2021-08-16 NOTE — ASSESSMENT & PLAN NOTE
Acute comminuted fracture of the right iliac bone involving the superior and lateral aspects of the right acetabulum. The fracture extends medially into the inferior aspect of the right SI joint with slight diastases of the right SI joint inferiorly. Acute minimally displaced fracture of the anterior wall and roof of the left acetabulum. Minimal diastases of the pubic symphysis.  8/17 ORIF of right transverse with posterior wall acetabulum fracture. Closed treatment without manipulation, left anterior wall acetabulum fracture.   MR left hip with no obvious femoral neck fracture seen.  Weight bearing status - Nonweightbearing BLE.  Redd Villagomez MD. Orthopedic Surgeon. [facility].   Jorge Holt MD. Orthopedic Surgeon. [facility].

## 2021-08-16 NOTE — PROGRESS NOTES
20yoM with right displaced acetabulum fx/dislocation s/p closed reduction with incarcerated fx fragment in hip joint s/p ORIF 8/16.  Left anterior wall acetabulum fx.  Questionable left nondisplaced femoral neck fx.  Right knee ligamentous injury.    S: Waking from anesthesia, somewhat confused but following commands    O:  Vitals:    08/16/21 0900   BP: 105/54   Pulse: (!) 104   Resp: 18   Temp:    SpO2: 98%     Exam:  General-Waking from anesthesia, somewhat confused but following commands  RLE-palp dp pulse, +EHL/FHL/TA/GS motor, knee immobilizer in place, CHANCE drain in place to hip, dressing c/d/i  LLE-palp dp pulse,  +EHL/FHL/TA/GS motor    A: 20yoM with right displaced acetabulum fx/dislocation s/p closed reduction with incarcerated fx fragment in hip joint s/p ORIF 8/16.  Left anterior wall acetabulum fx.  Questionable left nondisplaced femoral neck fx.  Right knee ligamentous injury.    Recs:  --NWB BLE  --MRI right knee, knee immobilizer for now  --MRI left hip to rule out nondisplaced femoral neck fx, if shows fx will likely need surgical fixation and could do tomorrow  --ancef x 48hrs postop  --continue hemovac drain for now
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
## 2021-08-16 NOTE — PROGRESS NOTES
8:35- Dr. Shoemaker at bedside, Speaking to family on the phone explaining the plan of care and surgery plan.

## 2021-08-16 NOTE — OR SURGEON
Immediate Post OP Note    PreOp Diagnosis: Left acetabulum fracture      PostOp Diagnosis: same      Procedure(s):  ORIF, FRACTURE, ACETABULUM - Wound Class: Clean with Drain    Surgeon(s):  QUETA Red M.D.    Anesthesiologist/Type of Anesthesia:  Anesthesiologist: Soni Alvarado M.D.; Yomi Conti M.D./General    Surgical Staff:  Circulator: Bin Ybarra R.N.; Charlene Cole R.N.  Relief Circulator: Florian Mtz R.N.  Relief Scrub: Parish Vasquez  Scrub Person: Jarred Coronel Assist: Sahil Reed P.A.-C.  Radiology Technologist: Jairon Frederick    Specimens removed if any:  * No specimens in log *    Estimated Blood Loss: 400cc    Findings: see dictation    Complications: none known    PLAN:  --readmit trauma surgery postop  --NWB BLE  --MRI right knee, knee immobilizer for now  --MRI left hip to rule out nondisplaced femoral neck fx  --ancef x 48hrs postop  --continue hemovac drain for now        8/16/2021 1:35 PM Jorge Holt M.D.
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
## 2021-08-16 NOTE — PROGRESS NOTES
Patient to OR, transported by ACLS RN, RT and transport. Patients mother and father at bedside. Signed consent with pre-op nurse in pre-op. Report given to pre-op RN and Anesthesiologist.

## 2021-08-16 NOTE — ANESTHESIA POSTPROCEDURE EVALUATION
Patient: Abdoulaye [age]    Procedure Summary     Date: 08/16/21 Room / Location: [place] [id number] / SURGERY [facility]    Anesthesia Start: 0910 Anesthesia Stop: 1414    Procedure: ORIF, FRACTURE, ACETABULUM (Right Hip) Diagnosis: (Right displaced acetabulum fracture dislocation )    Surgeons: Jorge Holt M.D. Responsible Provider: Yomi Conti M.D.    Anesthesia Type: general ASA Status: 3          Final Anesthesia Type: general  Last vitals  BP   Blood Pressure: 105/54    Temp   (!) 35.6 °C (96.1 °F)    Pulse   (!) 104   Resp   18    SpO2   98 %      Anesthesia Post Evaluation    Patient location during evaluation: PACU  Patient participation: complete - patient participated  Level of consciousness: awake and alert  Pain score: 4    Airway patency: patent  Anesthetic complications: no  Cardiovascular status: hemodynamically stable  Respiratory status: acceptable  Hydration status: euvolemic    PONV: none          No complications documented.
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
## 2021-08-16 NOTE — ASSESSMENT & PLAN NOTE
.  Definitive plan pending.  Weight bearing status - Nonweightbearing RLE.  Redd Villagomez MD. Orthopedic Surgeon. [facility].

## 2021-08-16 NOTE — ASSESSMENT & PLAN NOTE
Prophylactic anticoagulation for thrombotic prevention initially contraindicated secondary to elevated bleeding risk.  8/17 Trauma surveillance venous duplex scanning ordered.  8/16 Prophylactic dose enoxaparin initiated.

## 2021-08-16 NOTE — ASSESSMENT & PLAN NOTE
Intubated in trauma bay for airway protection.  Continue full mechanical ventilatory support. Ventilator bundle and Trauma weaning protocol.  8/16 Extubated.  Respiratory protocol.

## 2021-08-16 NOTE — PROGRESS NOTES
0132 Pt to S126 ED RN and RT. Pt placed on ICU monitor. Pt transferred to trauma bed while maintaining spinal precautions.     Vitals:  BP:160/86  HR:95  Temp:96.3 (bear hugger and warm blankets applied)  WT: Unable to assess due to bed malfunction    Assessment:   PERRL @ 3   No movement x 4 extremities likely due to previously given medication.     Skin assessment completed with Silva RN    -Small abrasion abnterior left ankle   -Open wound to left knee with visible fatty tissue  -Proximal rightl shin with multiple deep open wound and active bleeding  -Right lateral  knee open wound  -Bruising to mid chest and abdomen with scattered abrasions   -Bruising and abrasions to Left flank and abdomen  -Bruising  abrasions and tar noted to right arm   -Bilaterall red sclera   -Scattered abrasions forehead nasal bridge, chin and lip      bilateral legs irrigated and dressed with roll guaze   C-collar discontinued per Dr. Sanchez

## 2021-08-16 NOTE — PROGRESS NOTES
Pts gold necklace that was removed in the trauma bay was brought up to [place] [id number] by this RN. It was placed in labeled specimen cup and locked in pts med .
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
## 2021-08-16 NOTE — ASSESSMENT & PLAN NOTE
Admission blood alcohol level of 263.1.  Trauma alcohol withdrawal protocol initiated.  Alcohol withdrawal surveillance.  8/17 SBIRT completed.
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
## 2021-08-16 NOTE — ASSESSMENT & PLAN NOTE
Small knee effusion.  Aspiration at bedside in ICU.  Immobilizer.  MRI right knee with subtle anterolateral tibial plateau fracture seen, ligaments intact with MCL sprain per radiology report.  8/17 Irrigation with non-excisional debridement of right knee laceration and repair of laceration measuring about 4 cm in length.  Weight bearing status - Nonweightbearing RLE right knee hinged knee brace locked at 20 degrees of flexion.  Redd Villagomez MD. Orthopedic Surgeon. [facility].  Jorge Holt MD. Orthopedic Surgeon. [facility].

## 2021-08-16 NOTE — PROGRESS NOTES
Ortho MD Dahlia at bedside to assess pt. MD performing R knee needle aspiration to determine whether there is an open fx at this site. Per MD, no open fx. Knee immobilizer to R knee, STAT XR R knee, and CT knee ordered.
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
## 2021-08-16 NOTE — PROGRESS NOTES
20yoM with right displaced acetabulum fx/dislocation s/p closed reduction with incarcerated fx fragment in hip joint.  Left anterior wall acetabulum fx.  Questionable left nondisplaced femoral neck fx.  Questionable right knee ligamentous injury.    S: Intubated, sedated.    O:  Vitals:    08/16/21 0700   BP: 101/56   Pulse: (!) 115   Resp: 17   Temp:    SpO2: 98%     Exam:  General-intubated, sedated, not following commands  RLE-palp dp pulse, unable to obtain motor/sensory exam, knee immobilizer in place  LLE-palp dp pulse, unable to obtain motor/sensory exam    Hct: 45    A: 20yoM with right displaced acetabulum fx/dislocation s/p closed reduction with incarcerated fx fragment in hip joint.  Left anterior wall acetabulum fx.  Questionable left nondisplaced femoral neck fx.  Questionable right knee ligamentous injury.    Recs:  --I spoke with patients mother over the phone and recommend surgical reduction and fixation for his right acetabulum fx this am.  We discussed risks of surgery including infection, joint stiffness, neurovascular injury (sciatic nerve), posttraumatic arthritis, pain, bleeding and general risks of anesthesia.  She expressed understanding and wishes to have him proceed with surgery when possible.  --Will plan on getting MRI left hip postop to determine whether he in fact has a nondisplaced left femoral neck fx, may need MRI right knee as well

## 2021-08-17 ENCOUNTER — APPOINTMENT (OUTPATIENT)
Dept: RADIOLOGY | Facility: MEDICAL CENTER | Age: 21
DRG: 957 | End: 2021-08-17
Attending: SURGERY

## 2021-08-17 PROBLEM — Z91.048 ALLERGY TO ADHESIVE TAPE: Status: ACTIVE | Noted: 2021-08-17

## 2021-08-17 PROBLEM — F32.A DEPRESSION: Status: ACTIVE | Noted: 2021-08-17

## 2021-08-17 PROBLEM — Z78.9 NO CONTRAINDICATION TO DEEP VEIN THROMBOSIS (DVT) PROPHYLAXIS: Status: ACTIVE | Noted: 2021-08-16

## 2021-08-17 LAB
ALBUMIN SERPL BCP-MCNC: 2.9 G/DL (ref 3.2–4.9)
ALBUMIN/GLOB SERPL: 1.4 G/DL
ALP SERPL-CCNC: 46 U/L (ref 30–99)
ALT SERPL-CCNC: 38 U/L (ref 2–50)
AMPHET UR QL SCN: POSITIVE
ANION GAP SERPL CALC-SCNC: 9 MMOL/L (ref 7–16)
AST SERPL-CCNC: 95 U/L (ref 12–45)
BARBITURATES UR QL SCN: NEGATIVE
BASOPHILS # BLD AUTO: 0.2 % (ref 0–1.8)
BASOPHILS # BLD: 0.02 K/UL (ref 0–0.12)
BENZODIAZ UR QL SCN: NEGATIVE
BILIRUB SERPL-MCNC: 0.6 MG/DL (ref 0.1–1.5)
BUN SERPL-MCNC: 10 MG/DL (ref 8–22)
BZE UR QL SCN: NEGATIVE
CALCIUM SERPL-MCNC: 7.1 MG/DL (ref 8.5–10.5)
CANNABINOIDS UR QL SCN: POSITIVE
CHLORIDE SERPL-SCNC: 107 MMOL/L (ref 96–112)
CO2 SERPL-SCNC: 25 MMOL/L (ref 20–33)
CREAT SERPL-MCNC: 0.9 MG/DL (ref 0.5–1.4)
EOSINOPHIL # BLD AUTO: 0.01 K/UL (ref 0–0.51)
EOSINOPHIL NFR BLD: 0.1 % (ref 0–6.9)
ERYTHROCYTE [DISTWIDTH] IN BLOOD BY AUTOMATED COUNT: 46.3 FL (ref 35.9–50)
ERYTHROCYTE [DISTWIDTH] IN BLOOD BY AUTOMATED COUNT: 48.1 FL (ref 35.9–50)
GLOBULIN SER CALC-MCNC: 2.1 G/DL (ref 1.9–3.5)
GLUCOSE SERPL-MCNC: 139 MG/DL (ref 65–99)
HCT VFR BLD AUTO: 27.4 % (ref 42–52)
HCT VFR BLD AUTO: 28.8 % (ref 42–52)
HGB BLD-MCNC: 9 G/DL (ref 14–18)
HGB BLD-MCNC: 9.4 G/DL (ref 14–18)
IMM GRANULOCYTES # BLD AUTO: 0.04 K/UL (ref 0–0.11)
IMM GRANULOCYTES NFR BLD AUTO: 0.4 % (ref 0–0.9)
LYMPHOCYTES # BLD AUTO: 2.53 K/UL (ref 1–4.8)
LYMPHOCYTES NFR BLD: 23 % (ref 22–41)
MCH RBC QN AUTO: 31.8 PG (ref 27–33)
MCH RBC QN AUTO: 31.9 PG (ref 27–33)
MCHC RBC AUTO-ENTMCNC: 32.6 G/DL (ref 33.7–35.3)
MCHC RBC AUTO-ENTMCNC: 32.8 G/DL (ref 33.7–35.3)
MCV RBC AUTO: 96.8 FL (ref 81.4–97.8)
MCV RBC AUTO: 97.6 FL (ref 81.4–97.8)
METHADONE UR QL SCN: NEGATIVE
MONOCYTES # BLD AUTO: 1.2 K/UL (ref 0–0.85)
MONOCYTES NFR BLD AUTO: 10.9 % (ref 0–13.4)
NEUTROPHILS # BLD AUTO: 7.21 K/UL (ref 1.82–7.42)
NEUTROPHILS NFR BLD: 65.4 % (ref 44–72)
NRBC # BLD AUTO: 0 K/UL
NRBC BLD-RTO: 0 /100 WBC
OPIATES UR QL SCN: POSITIVE
OXYCODONE UR QL SCN: POSITIVE
PCP UR QL SCN: NEGATIVE
PLATELET # BLD AUTO: 136 K/UL (ref 164–446)
PLATELET # BLD AUTO: 141 K/UL (ref 164–446)
PMV BLD AUTO: 10.3 FL (ref 9–12.9)
PMV BLD AUTO: 10.3 FL (ref 9–12.9)
POTASSIUM SERPL-SCNC: 3.9 MMOL/L (ref 3.6–5.5)
PROPOXYPH UR QL SCN: NEGATIVE
PROT SERPL-MCNC: 5 G/DL (ref 6–8.2)
RBC # BLD AUTO: 2.83 M/UL (ref 4.7–6.1)
RBC # BLD AUTO: 2.95 M/UL (ref 4.7–6.1)
SODIUM SERPL-SCNC: 141 MMOL/L (ref 135–145)
WBC # BLD AUTO: 11 K/UL (ref 4.8–10.8)
WBC # BLD AUTO: 12.3 K/UL (ref 4.8–10.8)

## 2021-08-17 PROCEDURE — 700111 HCHG RX REV CODE 636 W/ 250 OVERRIDE (IP): Performed by: SURGERY

## 2021-08-17 PROCEDURE — 80307 DRUG TEST PRSMV CHEM ANLYZR: CPT

## 2021-08-17 PROCEDURE — 700102 HCHG RX REV CODE 250 W/ 637 OVERRIDE(OP): Performed by: SURGERY

## 2021-08-17 PROCEDURE — 90791 PSYCH DIAGNOSTIC EVALUATION: CPT | Performed by: SOCIAL WORKER

## 2021-08-17 PROCEDURE — A9270 NON-COVERED ITEM OR SERVICE: HCPCS | Performed by: NURSE PRACTITIONER

## 2021-08-17 PROCEDURE — A9270 NON-COVERED ITEM OR SERVICE: HCPCS | Performed by: SURGERY

## 2021-08-17 PROCEDURE — 700105 HCHG RX REV CODE 258: Performed by: SURGERY

## 2021-08-17 PROCEDURE — 700111 HCHG RX REV CODE 636 W/ 250 OVERRIDE (IP): Performed by: ORTHOPAEDIC SURGERY

## 2021-08-17 PROCEDURE — 80053 COMPREHEN METABOLIC PANEL: CPT

## 2021-08-17 PROCEDURE — 85027 COMPLETE CBC AUTOMATED: CPT

## 2021-08-17 PROCEDURE — A9270 NON-COVERED ITEM OR SERVICE: HCPCS | Performed by: STUDENT IN AN ORGANIZED HEALTH CARE EDUCATION/TRAINING PROGRAM

## 2021-08-17 PROCEDURE — 99223 1ST HOSP IP/OBS HIGH 75: CPT | Mod: GC | Performed by: PSYCHIATRY & NEUROLOGY

## 2021-08-17 PROCEDURE — 700102 HCHG RX REV CODE 250 W/ 637 OVERRIDE(OP): Performed by: STUDENT IN AN ORGANIZED HEALTH CARE EDUCATION/TRAINING PROGRAM

## 2021-08-17 PROCEDURE — 97535 SELF CARE MNGMENT TRAINING: CPT

## 2021-08-17 PROCEDURE — 700102 HCHG RX REV CODE 250 W/ 637 OVERRIDE(OP): Performed by: NURSE PRACTITIONER

## 2021-08-17 PROCEDURE — 85025 COMPLETE CBC W/AUTO DIFF WBC: CPT

## 2021-08-17 PROCEDURE — 770001 HCHG ROOM/CARE - MED/SURG/GYN PRIV*

## 2021-08-17 PROCEDURE — L4398 FOOT DROP SPLINT PRE OTS: HCPCS

## 2021-08-17 PROCEDURE — 99232 SBSQ HOSP IP/OBS MODERATE 35: CPT | Performed by: SURGERY

## 2021-08-17 RX ORDER — GABAPENTIN 300 MG/1
300 CAPSULE ORAL EVERY 8 HOURS
Status: DISCONTINUED | OUTPATIENT
Start: 2021-08-17 | End: 2021-08-21

## 2021-08-17 RX ORDER — GABAPENTIN 100 MG/1
100 CAPSULE ORAL EVERY 8 HOURS
Status: DISCONTINUED | OUTPATIENT
Start: 2021-08-17 | End: 2021-08-17

## 2021-08-17 RX ORDER — METAXALONE 800 MG/1
800 TABLET ORAL 3 TIMES DAILY
Status: DISCONTINUED | OUTPATIENT
Start: 2021-08-17 | End: 2021-08-24 | Stop reason: HOSPADM

## 2021-08-17 RX ORDER — MORPHINE SULFATE 15 MG/1
15 TABLET, FILM COATED, EXTENDED RELEASE ORAL EVERY 12 HOURS
Status: DISCONTINUED | OUTPATIENT
Start: 2021-08-17 | End: 2021-08-20

## 2021-08-17 RX ORDER — DIPHENHYDRAMINE HYDROCHLORIDE 50 MG/ML
25 INJECTION INTRAMUSCULAR; INTRAVENOUS ONCE
Status: COMPLETED | OUTPATIENT
Start: 2021-08-17 | End: 2021-08-17

## 2021-08-17 RX ORDER — FLUOXETINE HYDROCHLORIDE 20 MG/1
20 CAPSULE ORAL DAILY
Status: DISCONTINUED | OUTPATIENT
Start: 2021-08-17 | End: 2021-08-24 | Stop reason: HOSPADM

## 2021-08-17 RX ORDER — ACETAMINOPHEN 500 MG
1000 TABLET ORAL EVERY 6 HOURS
Status: DISPENSED | OUTPATIENT
Start: 2021-08-17 | End: 2021-08-20

## 2021-08-17 RX ORDER — ACETAMINOPHEN 500 MG
1000 TABLET ORAL EVERY 6 HOURS PRN
Status: DISCONTINUED | OUTPATIENT
Start: 2021-08-21 | End: 2021-08-23

## 2021-08-17 RX ADMIN — DOCUSATE SODIUM 50 MG AND SENNOSIDES 8.6 MG 1 TABLET: 8.6; 5 TABLET, FILM COATED ORAL at 22:09

## 2021-08-17 RX ADMIN — FLUOXETINE 20 MG: 20 CAPSULE ORAL at 13:01

## 2021-08-17 RX ADMIN — CEFAZOLIN SODIUM 2 G: 2 INJECTION, SOLUTION INTRAVENOUS at 14:08

## 2021-08-17 RX ADMIN — METAXALONE 800 MG: 800 TABLET ORAL at 13:02

## 2021-08-17 RX ADMIN — DOCUSATE SODIUM 100 MG: 100 CAPSULE, LIQUID FILLED ORAL at 12:49

## 2021-08-17 RX ADMIN — ENOXAPARIN SODIUM 30 MG: 30 INJECTION SUBCUTANEOUS at 18:14

## 2021-08-17 RX ADMIN — MORPHINE SULFATE 4 MG: 4 INJECTION INTRAVENOUS at 05:49

## 2021-08-17 RX ADMIN — OXYCODONE HYDROCHLORIDE 10 MG: 10 TABLET ORAL at 22:09

## 2021-08-17 RX ADMIN — GABAPENTIN 100 MG: 100 CAPSULE ORAL at 09:41

## 2021-08-17 RX ADMIN — GABAPENTIN 300 MG: 300 CAPSULE ORAL at 22:09

## 2021-08-17 RX ADMIN — CEFAZOLIN SODIUM 2 G: 2 INJECTION, SOLUTION INTRAVENOUS at 04:36

## 2021-08-17 RX ADMIN — POLYETHYLENE GLYCOL 3350 1 PACKET: 17 POWDER, FOR SOLUTION ORAL at 18:14

## 2021-08-17 RX ADMIN — MORPHINE SULFATE 15 MG: 15 TABLET, FILM COATED, EXTENDED RELEASE ORAL at 18:15

## 2021-08-17 RX ADMIN — OXYCODONE HYDROCHLORIDE 10 MG: 10 TABLET ORAL at 11:31

## 2021-08-17 RX ADMIN — DIPHENHYDRAMINE HYDROCHLORIDE 25 MG: 50 INJECTION, SOLUTION INTRAMUSCULAR; INTRAVENOUS at 10:15

## 2021-08-17 RX ADMIN — OXYCODONE HYDROCHLORIDE 10 MG: 10 TABLET ORAL at 16:46

## 2021-08-17 RX ADMIN — ACETAMINOPHEN 650 MG: 325 TABLET, FILM COATED ORAL at 11:31

## 2021-08-17 RX ADMIN — GABAPENTIN 300 MG: 300 CAPSULE ORAL at 14:01

## 2021-08-17 RX ADMIN — SODIUM CHLORIDE: 9 INJECTION, SOLUTION INTRAVENOUS at 00:23

## 2021-08-17 RX ADMIN — DOCUSATE SODIUM 100 MG: 100 CAPSULE, LIQUID FILLED ORAL at 18:13

## 2021-08-17 RX ADMIN — MORPHINE SULFATE 4 MG: 4 INJECTION INTRAVENOUS at 13:12

## 2021-08-17 RX ADMIN — ACETAMINOPHEN 1000 MG: 500 TABLET ORAL at 18:14

## 2021-08-17 RX ADMIN — MAGNESIUM HYDROXIDE 30 ML: 2400 SUSPENSION ORAL at 12:50

## 2021-08-17 RX ADMIN — MORPHINE SULFATE 15 MG: 15 TABLET, FILM COATED, EXTENDED RELEASE ORAL at 09:41

## 2021-08-17 RX ADMIN — METAXALONE 800 MG: 800 TABLET ORAL at 18:13

## 2021-08-17 RX ADMIN — CEFAZOLIN SODIUM 2 G: 2 INJECTION, SOLUTION INTRAVENOUS at 22:09

## 2021-08-17 RX ADMIN — MORPHINE SULFATE 4 MG: 4 INJECTION INTRAVENOUS at 02:35

## 2021-08-17 RX ADMIN — POLYETHYLENE GLYCOL 3350 1 PACKET: 17 POWDER, FOR SOLUTION ORAL at 12:50

## 2021-08-17 ASSESSMENT — ENCOUNTER SYMPTOMS
ROS GI COMMENTS: BM PRIOR TO ARRIVAL
BACK PAIN: 0
TINGLING: 1
SPEECH CHANGE: 0
MYALGIAS: 1
BLURRED VISION: 0
CHILLS: 0
DEPRESSION: 1
VOMITING: 0
DIARRHEA: 1
HEADACHES: 0
NAUSEA: 0
ABDOMINAL PAIN: 1
SENSORY CHANGE: 1
CONSTIPATION: 0
DIZZINESS: 0
SHORTNESS OF BREATH: 0
DOUBLE VISION: 0
NECK PAIN: 0
FEVER: 0
HEADACHES: 1

## 2021-08-17 ASSESSMENT — LIFESTYLE VARIABLES
SUBSTANCE_ABUSE: 1
SUBSTANCE_ABUSE: 0

## 2021-08-17 ASSESSMENT — PAIN DESCRIPTION - PAIN TYPE
TYPE: ACUTE PAIN

## 2021-08-17 NOTE — CARE PLAN
Problem: Pain - Standard  Goal: Alleviation of pain or a reduction in pain to the patient’s comfort goal  Outcome: Progressing  Note: PRN medication q3h, pain with movement     Problem: Skin Integrity  Goal: Skin integrity is maintained or improved  Outcome: Progressing  Note: Knee immobilizer removed for assessment, unable to assess under ace wrap. Turn q2h, heels elevated on pillows     Problem: Hemodynamics  Goal: Patient's hemodynamics, fluid balance and neurologic status will be stable or improve  Outcome: Progressing   The patient is Stable - Low risk of patient condition declining or worsening    Shift Goals  Clinical Goals: decrease anxiety and pain  Patient Goals: decrease pain  Family Goals: [employer] requests     Progress made toward(s) clinical / shift goals:  pain tolerable at rest    Patient is not progressing towards the following goals: 9/10 pain with movement

## 2021-08-17 NOTE — PROGRESS NOTES
Hinged Knee brace set at 20 degrees flexion has been applied and fitted to pt's R LE. Pt presents positive CMS both pre and post application of brace.

## 2021-08-17 NOTE — PROGRESS NOTES
MRI left hip reviewed.  No obvious femoral neck fracture seen.  No surgical intervention required.  Right subtrochanteric femur edema consistent with temporary pin placement for lateral joint distraction during recent acetabulum fracture surgery and not consistent with traumatic injury or fracture.     MRI right knee reviewed, subtle anterolateral tibial plateau fracture seen, ligaments intact with MCL sprain per radiology report.    Recommend right knee hinged knee brace locked at 20 degrees of flexion and NWB BLE.  No plan for further surgery at this point.

## 2021-08-17 NOTE — CARE PLAN
Problem: Ventilation Defect:  Goal: Ability to achieve and maintain unassisted ventilation or tolerate decreased levels of ventilator support  Outcome: Met   Extubated before coming back from surgery
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
## 2021-08-17 NOTE — CARE PLAN
The patient is Stable - Low risk of patient condition declining or worsening    Shift Goals  Clinical Goals: reduce sedation, OR for fractures  Patient Goals: unable to assess  Family Goals: Taoism requests     Progress made toward(s) clinical / shift goals:    Problem: Pain - Standard  Goal: Alleviation of pain or a reduction in pain to the patient’s comfort goal  Outcome: Progressing   Patient evaluated for pain and appropriate intervention provided.     Problem: Mechanical Ventilation  Goal: Safe management of artificial airway and ventilation  Outcome: Met    Patient extubated.

## 2021-08-17 NOTE — THERAPY
Missed Therapy Evaluation    Patient Name: Barron Armendariz  Age:  20 y.o., Sex:  male  Medical Record #: [id number]  Today's Date: 8/17/2021      Attempted PT evaluation. Pt refused mobility due to fatigue and reported he sat EOB with nursing earlier. Prior level of function and home set up collected from mom at bedside. Pts mother is studying for her nursing [employer] and is applying for FMLA for 12 weeks to assist at dc. Pt and pts mother provided with expectations of mobility ie. use of WC with slide board transfers and need for ramp to enter home. Pt would prefer to dc home rather than rehab if possible. Ecouraged pt to mobilize with nursing everyday and perform ankle pumps while in bed to assist with edema. Will attempt PT eval tomorrow         08/17/21 5497   Precautions   Precautions Fall Risk;Non Weight Bearing Left Lower Extremity;Non Weight Bearing Right Lower Extremity;Immobilizer Right Lower Extremity   Comments R knee locked at 20 deg, L LE NWB 6-8 weeks, R LE NWB 12 weeks   Prior Living Situation   Prior Services None   Housing / Facility 1 Story House   Steps Into Home 1   Steps In Home 0   Equipment Owned None   Lives with - Patient's Self Care Capacity Parents;Other (Comments)   Comments pt lives with his mother and his uncle. His mom is a grad nurse studying for her [employer]. She is planning on taking 12 weeks FMLA to assist with pt at [place]. Pt would prefer not to go to rehab   Prior Level of Functional Mobility   Bed Mobility Independent   Transfer Status Independent   Ambulation Independent   Distance Ambulation (Feet)   (community)   Assistive Devices Used None   Stairs Independent   Comments independent    Education Group   Education Provided Role of Physical Therapist   Role of Physical Therapist Patient Response Patient;Family;Acceptance;Explanation;Verbal Demonstration   Additional Comments refused mobility
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
## 2021-08-17 NOTE — PROGRESS NOTES
1:1 observation began @ 1150 by tech, room safety checklist completed by [facility] and hung outside of door
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
## 2021-08-17 NOTE — PROGRESS NOTES
"TRAUMA TERTIARY SURVEY     Mental status adequate for full examination?: Yes    Spine cleared (radiologically and/or clinically): Yes    PHYSICAL EXAMINATION:  Vitals: /65   Pulse 83   Temp (!) 35.6 °C (96.1 °F) (Temporal)   Resp (!) 21   Ht 1.753 m (5' 9\")   Wt 79.4 kg (175 lb)   SpO2 100%   BMI 25.84 kg/m²   Constitutional:     General Appearance: appears stated age, is in no apparent distress, is well developed and well nourished.  HEENT:    Facial edema, right cheek skin tear. The pupils are equal, round, and reactive to light bilaterally. The extraocular muscles are intact bilaterally.. The nares and oropharynx are clear. The midface and jaw are stable. No malocclusion is evident.  Neck:    The cervical spine is supple and non tender. Normal range of motion. No posterior midline cervical-spine tenderness, no evidence of intoxication, normal level of alertness ([place] Coma Scale 15), no focal neurologic deficit, and no painful distracting injuries. The trachea is midline. No significant abrasions, lacerations, contusions, punctures, or swelling. No crepitance. No jugulovenous distension.  Respiratory:   Inspection: Unlabored respirations, no intercostal retractions, paradoxical motion, or accessory muscle use.   Palpation:  The chest is nontender. The clavicles are non deformed bilaterally..   Auscultation: clear to auscultation.  Cardiovascular:   Auscultation: regular rate and rhythm.   Peripheral Pulses: Normal.   Abdomen:   Abdomen is soft, mild tenderness, abrasion to upper abdomen.  Genitourinary:   (MALE): Not visualized, anderson catheter in place.  Musculoskeletal:   Palvis is tender to palpation. Right knee dressing in place, RLE hinge knee brace in place. Dressing to left knee.  Skin:   The skin is warm and dry.  Neurologic:    [place] Coma Scale (GCS) 15 [id number]. Neurologic examination revealed no focal deficits noted, mental status intact.  Psychiatric:   The patient does not appear " depressed or anxious.    IMAGING:  MR-HIP-W/O LEFT   Final Result      1.  Subtle edema in the anterior aspect of the superior greater trochanter correlates with fracture seen on CT. No extension of the fracture into the femoral neck is appreciated. Edema in the left superolateral femoral head and lateral femoral neck is    noted without a discrete fracture line identified.      2.  Left anterior acetabular fracture is again noted as well.      3.  Tendinopathy of the left gluteus medius and minimus tendons.      4.  Linear low signal in the subtrochanteric right femur on coronal images. No cortical disruption is identified on recent CT. Poor fat saturation prohibits evaluation for marrow edema secondary to susceptibility artifact from surgical hardware. If    clinically indicated, repeat MRI of the right femur using STIR sequences may be helpful to exclude nondisplaced fracture      MR-KNEE-W/O RIGHT   Final Result      1.  Lateral tibial plateau fracture with minimal irregularity of the overlying cortex in the lateral compartment. Edema extends into the anterior tibial spines and anterior medial tibial plateau without significant depression.      2.  Edema surrounding the medial collateral ligament is consistent with a sprain.      3.  Moderate hemarthrosis      4.  Diffuse subcutaneous edema around the knee joint.      DX-PELVIS-1 OR 2 VIEWS   Final Result      Digitized intraoperative radiograph is submitted for review.  This examination is not for diagnostic purpose but for guidance during a surgical procedure.      DX-PORTABLE FLUORO > 1 HOUR   Preliminary Result      Portable fluoroscopy utilized for 2 minutes.         INTERPRETING LOCATION: [address] [place] [address], [id number]      DX-CHEST-PORTABLE (1 VIEW)   Final Result      Stable chest. No focal pulmonary consolidation.      CT-PELVIS W/O   Final Result      1.  Successful reduction of the right femoral head posterior dislocation.      2.  No right femoral head  or neck fracture identified.      3.  Residual 10 x 5 mm fragment of bone within the right hip joint      4.  Comminuted right acetabular fracture again noted with minimal residual diastases of the inferior aspect of the right SI joint.      5.  No significant residual diastases of the pubic symphysis.      6.  Fracture of the left acetabulum and anterior aspect of the distal left femoral neck again noted.      7.  Increase in size of pelvic hematoma anterior and left of the bladder with slight displacement of the bladder to the right. No evidence of bladder leak.      DX-PELVIS-1 OR 2 VIEWS   Final Result      No residual right femoral head dislocation identified on AP view of the pelvis.      CT-CTA LOWER EXT WITH & W/O-POST PROCESS RIGHT   Final Result      1.  No evidence of right iliac or right femoral arterial extravasation or occlusion.      2.  Patent right obturator arteries without evidence of occlusion or extravasation.      3.  Small knee effusion.      4.  Right acetabular fracture, posterior subluxation of the right femoral head, inferior right SI joint and pubic symphysis diastases, and left acetabular fractures.      CT-LSPINE W/O PLUS RECONS   Final Result         1.  No lumbar compression fracture, disc space narrowing, or subluxation.      2.  Bilateral acetabular fractures, right femoral head posterior subluxation, inferior right SI joint diastases, and pubic symphysis diastases are again noted      CT-CHEST,ABDOMEN,PELVIS WITH   Final Result      1.  Alveolar opacity in the medial aspect of the right upper lobe which represent an area of contusion or pneumonitis. Differential includes aspiration pneumonia.      2.  No pneumothorax or mediastinal injury.      3.  No abdominal or pelvic solid organ injury or free fluid.      4.  Acute fracture of the right acetabulum with posterior subluxation of the right femoral head.      5.  The right acetabular fracture extends to the inferior aspect of the  right SI joint. Minimal diastases of the inferior aspect of the right SI joint.      6.  Minimal diastases of the pubic symphysis.      7.  Acute minimally displaced fracture of the anterior wall and roof of the left acetabulum.      8.  Minimal fracture of the distal anterior aspect of the left femoral neck.      CT-CSPINE WITHOUT PLUS RECONS   Final Result         Negative CT scan of the cervical spine.  No fracture or subluxation.      CT-HEAD W/O   Final Result      No evidence of acute intracranial process.      CT-TSPINE W/O PLUS RECONS   Final Result         1.  No thoracic vertebral body compression fracture or subluxation. No posterior element fracture.      2.  Alveolar opacity in the medial aspect of the right upper lobe which may represent area of contusion or pneumonitis. Differential would include aspiration pneumonitis.      3.  No pneumothorax identified.      4.  Minimal bibasilar atelectasis or contusion in each lower lobe.      US-ABDOMEN F.A.S.T. LTD (FOR ED USE ONLY)   Final Result      No free fluid seen in all 4 quadrants.      Negative FAST scan.            DX-TIBIA AND FIBULA RIGHT   Final Result      1.  No RIGHT tibia-fibula fracture identified on limited AP views.      2.  Nonvisualization of the right ankle mortise due to overlying traction device.         DX-CHEST-LIMITED (1 VIEW)   Final Result      1.  No focal pulmonary consolidation.      2.  Satisfactory position of endotracheal tube and NG tube.      DX-PELVIS-1 OR 2 VIEWS   Final Result      1.  Acute comminuted fracture of the right iliac bone involving the superior and lateral aspects of the right acetabulum. The fracture extends medially into the inferior aspect of the right SI joint with slight diastases of the right SI joint inferiorly.      2.  Minimal diastases of the pubic symphysis.      3.  No right femoral head or neck fracture identified.      4.  No left hip fracture identified.        All current laboratory  studies/radiology exams reviewed: Yes    Completed Consultations:  Dr. Holt, orthopedic surgery  Dr. Schaffer, psychiatry    Pending Consultations:  None    Newly Identified Diagnoses and Injuries:  None    TOTAL RAP SCORE:  RAP Score Total: 4      ETOH Screening     Assessment complete date: 8/17/2021 (Admission BA 0.26, CAGE not completed)  Intervention: yes. Patient response to intervention: Drinks socially a few times a month, denies habitual alcohol use, smokes marijuana, denies tobacco or illicit drug use..   Patient demonstrates understanding of intervention. Patient does not agree to follow-up.   has not been contacted. Follow up with: PCP  Total ETOH intervention time: 15 - 30 mintues
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
## 2021-08-17 NOTE — ASSESSMENT & PLAN NOTE
History of SI, significant depression   8/17 Legal hold extended. 1:1 sitter.  Fluoxetine 20 mg p.o. daily initiated.   8/23 Medical clearance signed  Tiffanie Schaffer M.D. Psychiatry.

## 2021-08-18 PROBLEM — Z11.52 ENCOUNTER FOR SCREENING FOR COVID-19: Status: RESOLVED | Noted: 2021-08-16 | Resolved: 2021-08-18

## 2021-08-18 PROBLEM — D62 ANEMIA ASSOCIATED WITH ACUTE BLOOD LOSS: Status: ACTIVE | Noted: 2021-08-18

## 2021-08-18 PROBLEM — J96.90 RESPIRATORY FAILURE FOLLOWING TRAUMA (HCC): Status: RESOLVED | Noted: 2021-08-16 | Resolved: 2021-08-18

## 2021-08-18 PROBLEM — J69.0 ASPIRATION PNEUMONIA (HCC): Status: RESOLVED | Noted: 2021-08-16 | Resolved: 2021-08-18

## 2021-08-18 LAB
ALBUMIN SERPL BCP-MCNC: 2.9 G/DL (ref 3.2–4.9)
ALBUMIN/GLOB SERPL: 1.2 G/DL
ALP SERPL-CCNC: 52 U/L (ref 30–99)
ALT SERPL-CCNC: 34 U/L (ref 2–50)
ANION GAP SERPL CALC-SCNC: 8 MMOL/L (ref 7–16)
AST SERPL-CCNC: 82 U/L (ref 12–45)
BASOPHILS # BLD AUTO: 0.3 % (ref 0–1.8)
BASOPHILS # BLD: 0.03 K/UL (ref 0–0.12)
BILIRUB SERPL-MCNC: 0.8 MG/DL (ref 0.1–1.5)
BUN SERPL-MCNC: 7 MG/DL (ref 8–22)
CALCIUM SERPL-MCNC: 8 MG/DL (ref 8.5–10.5)
CHLORIDE SERPL-SCNC: 103 MMOL/L (ref 96–112)
CO2 SERPL-SCNC: 26 MMOL/L (ref 20–33)
CREAT SERPL-MCNC: 0.78 MG/DL (ref 0.5–1.4)
EOSINOPHIL # BLD AUTO: 0.02 K/UL (ref 0–0.51)
EOSINOPHIL NFR BLD: 0.2 % (ref 0–6.9)
ERYTHROCYTE [DISTWIDTH] IN BLOOD BY AUTOMATED COUNT: 45.4 FL (ref 35.9–50)
FERRITIN SERPL-MCNC: 279 NG/ML (ref 22–322)
GLOBULIN SER CALC-MCNC: 2.4 G/DL (ref 1.9–3.5)
GLUCOSE SERPL-MCNC: 123 MG/DL (ref 65–99)
HCT VFR BLD AUTO: 25.3 % (ref 42–52)
HGB BLD-MCNC: 8.5 G/DL (ref 14–18)
HGB RETIC QN AUTO: 31.8 PG/CELL (ref 29–35)
IMM GRANULOCYTES # BLD AUTO: 0.04 K/UL (ref 0–0.11)
IMM GRANULOCYTES NFR BLD AUTO: 0.4 % (ref 0–0.9)
IMM RETICS NFR: 7.6 % (ref 9.3–17.4)
IRON SATN MFR SERPL: 11 % (ref 15–55)
IRON SERPL-MCNC: 20 UG/DL (ref 50–180)
LYMPHOCYTES # BLD AUTO: 2.12 K/UL (ref 1–4.8)
LYMPHOCYTES NFR BLD: 21.5 % (ref 22–41)
MCH RBC QN AUTO: 32 PG (ref 27–33)
MCHC RBC AUTO-ENTMCNC: 33.6 G/DL (ref 33.7–35.3)
MCV RBC AUTO: 95.1 FL (ref 81.4–97.8)
MONOCYTES # BLD AUTO: 0.79 K/UL (ref 0–0.85)
MONOCYTES NFR BLD AUTO: 8 % (ref 0–13.4)
NEUTROPHILS # BLD AUTO: 6.88 K/UL (ref 1.82–7.42)
NEUTROPHILS NFR BLD: 69.6 % (ref 44–72)
NRBC # BLD AUTO: 0 K/UL
NRBC BLD-RTO: 0 /100 WBC
PLATELET # BLD AUTO: 125 K/UL (ref 164–446)
PMV BLD AUTO: 10.2 FL (ref 9–12.9)
POTASSIUM SERPL-SCNC: 3.9 MMOL/L (ref 3.6–5.5)
PROT SERPL-MCNC: 5.3 G/DL (ref 6–8.2)
RBC # BLD AUTO: 2.66 M/UL (ref 4.7–6.1)
RETICS # AUTO: 0.05 M/UL (ref 0.04–0.06)
RETICS/RBC NFR: 1.7 % (ref 0.8–2.1)
SODIUM SERPL-SCNC: 137 MMOL/L (ref 135–145)
TIBC SERPL-MCNC: 175 UG/DL (ref 250–450)
UIBC SERPL-MCNC: 155 UG/DL (ref 110–370)
WBC # BLD AUTO: 9.9 K/UL (ref 4.8–10.8)

## 2021-08-18 PROCEDURE — 97166 OT EVAL MOD COMPLEX 45 MIN: CPT

## 2021-08-18 PROCEDURE — 700111 HCHG RX REV CODE 636 W/ 250 OVERRIDE (IP): Performed by: ORTHOPAEDIC SURGERY

## 2021-08-18 PROCEDURE — 97162 PT EVAL MOD COMPLEX 30 MIN: CPT

## 2021-08-18 PROCEDURE — 700102 HCHG RX REV CODE 250 W/ 637 OVERRIDE(OP): Performed by: NURSE PRACTITIONER

## 2021-08-18 PROCEDURE — 85025 COMPLETE CBC W/AUTO DIFF WBC: CPT

## 2021-08-18 PROCEDURE — 700111 HCHG RX REV CODE 636 W/ 250 OVERRIDE (IP): Performed by: SURGERY

## 2021-08-18 PROCEDURE — 700102 HCHG RX REV CODE 250 W/ 637 OVERRIDE(OP): Performed by: SURGERY

## 2021-08-18 PROCEDURE — A9270 NON-COVERED ITEM OR SERVICE: HCPCS | Performed by: SURGERY

## 2021-08-18 PROCEDURE — A9270 NON-COVERED ITEM OR SERVICE: HCPCS | Performed by: STUDENT IN AN ORGANIZED HEALTH CARE EDUCATION/TRAINING PROGRAM

## 2021-08-18 PROCEDURE — 82728 ASSAY OF FERRITIN: CPT

## 2021-08-18 PROCEDURE — 700102 HCHG RX REV CODE 250 W/ 637 OVERRIDE(OP): Performed by: STUDENT IN AN ORGANIZED HEALTH CARE EDUCATION/TRAINING PROGRAM

## 2021-08-18 PROCEDURE — 97535 SELF CARE MNGMENT TRAINING: CPT

## 2021-08-18 PROCEDURE — 85046 RETICYTE/HGB CONCENTRATE: CPT

## 2021-08-18 PROCEDURE — A9270 NON-COVERED ITEM OR SERVICE: HCPCS | Performed by: NURSE PRACTITIONER

## 2021-08-18 PROCEDURE — 83550 IRON BINDING TEST: CPT

## 2021-08-18 PROCEDURE — 80053 COMPREHEN METABOLIC PANEL: CPT

## 2021-08-18 PROCEDURE — 83540 ASSAY OF IRON: CPT

## 2021-08-18 PROCEDURE — 36415 COLL VENOUS BLD VENIPUNCTURE: CPT

## 2021-08-18 PROCEDURE — 770001 HCHG ROOM/CARE - MED/SURG/GYN PRIV*

## 2021-08-18 RX ADMIN — ACETAMINOPHEN 1000 MG: 500 TABLET ORAL at 01:15

## 2021-08-18 RX ADMIN — OXYCODONE HYDROCHLORIDE 10 MG: 10 TABLET ORAL at 08:48

## 2021-08-18 RX ADMIN — POLYETHYLENE GLYCOL 3350 1 PACKET: 17 POWDER, FOR SOLUTION ORAL at 04:33

## 2021-08-18 RX ADMIN — DOCUSATE SODIUM 100 MG: 100 CAPSULE, LIQUID FILLED ORAL at 04:33

## 2021-08-18 RX ADMIN — ENOXAPARIN SODIUM 30 MG: 30 INJECTION SUBCUTANEOUS at 18:04

## 2021-08-18 RX ADMIN — METAXALONE 800 MG: 800 TABLET ORAL at 13:54

## 2021-08-18 RX ADMIN — CEFAZOLIN SODIUM 2 G: 2 INJECTION, SOLUTION INTRAVENOUS at 04:51

## 2021-08-18 RX ADMIN — MAGNESIUM HYDROXIDE 30 ML: 2400 SUSPENSION ORAL at 04:33

## 2021-08-18 RX ADMIN — MORPHINE SULFATE 15 MG: 15 TABLET, FILM COATED, EXTENDED RELEASE ORAL at 04:31

## 2021-08-18 RX ADMIN — FLUOXETINE 20 MG: 20 CAPSULE ORAL at 04:31

## 2021-08-18 RX ADMIN — GABAPENTIN 300 MG: 300 CAPSULE ORAL at 22:17

## 2021-08-18 RX ADMIN — METAXALONE 800 MG: 800 TABLET ORAL at 04:32

## 2021-08-18 RX ADMIN — ACETAMINOPHEN 1000 MG: 500 TABLET ORAL at 23:22

## 2021-08-18 RX ADMIN — OXYCODONE 5 MG: 5 TABLET ORAL at 22:16

## 2021-08-18 RX ADMIN — ACETAMINOPHEN 1000 MG: 500 TABLET ORAL at 13:54

## 2021-08-18 RX ADMIN — ACETAMINOPHEN 1000 MG: 500 TABLET ORAL at 04:31

## 2021-08-18 RX ADMIN — MORPHINE SULFATE 4 MG: 4 INJECTION INTRAVENOUS at 09:53

## 2021-08-18 RX ADMIN — GABAPENTIN 300 MG: 300 CAPSULE ORAL at 13:54

## 2021-08-18 RX ADMIN — ENOXAPARIN SODIUM 30 MG: 30 INJECTION SUBCUTANEOUS at 04:33

## 2021-08-18 RX ADMIN — DOCUSATE SODIUM 100 MG: 100 CAPSULE, LIQUID FILLED ORAL at 18:04

## 2021-08-18 RX ADMIN — GABAPENTIN 300 MG: 300 CAPSULE ORAL at 04:31

## 2021-08-18 ASSESSMENT — ENCOUNTER SYMPTOMS
SHORTNESS OF BREATH: 0
ABDOMINAL PAIN: 0
SENSORY CHANGE: 1
SPEECH CHANGE: 0
BLURRED VISION: 0
BACK PAIN: 0
TINGLING: 1
VOMITING: 0
FEVER: 0
NAUSEA: 0
DOUBLE VISION: 0
MYALGIAS: 1
ROS GI COMMENTS: BM PRIOR TO ARRIVAL
CHILLS: 0
DIZZINESS: 0
HEADACHES: 0
NECK PAIN: 0

## 2021-08-18 ASSESSMENT — GAIT ASSESSMENTS: GAIT LEVEL OF ASSIST: UNABLE TO PARTICIPATE

## 2021-08-18 ASSESSMENT — PAIN DESCRIPTION - PAIN TYPE
TYPE: ACUTE PAIN

## 2021-08-18 ASSESSMENT — COGNITIVE AND FUNCTIONAL STATUS - GENERAL
DRESSING REGULAR LOWER BODY CLOTHING: A LOT
DAILY ACTIVITIY SCORE: 18
CLIMB 3 TO 5 STEPS WITH RAILING: TOTAL
TOILETING: A LITTLE
HELP NEEDED FOR BATHING: A LOT
STANDING UP FROM CHAIR USING ARMS: TOTAL
MOBILITY SCORE: 6
SUGGESTED CMS G CODE MODIFIER DAILY ACTIVITY: CK
TURNING FROM BACK TO SIDE WHILE IN FLAT BAD: UNABLE
PERSONAL GROOMING: A LITTLE
MOVING TO AND FROM BED TO CHAIR: UNABLE
MOVING FROM LYING ON BACK TO SITTING ON SIDE OF FLAT BED: UNABLE
SUGGESTED CMS G CODE MODIFIER MOBILITY: CN
WALKING IN HOSPITAL ROOM: TOTAL

## 2021-08-18 ASSESSMENT — LIFESTYLE VARIABLES: SUBSTANCE_ABUSE: 0

## 2021-08-18 ASSESSMENT — ACTIVITIES OF DAILY LIVING (ADL): TOILETING: INDEPENDENT
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
## 2021-08-18 NOTE — CARE PLAN
The patient is Watcher - Medium risk of patient condition declining or worsening    Shift Goals  Clinical Goals: Pain control, safety   Patient Goals: Adequate pain control  Family Goals: Keep patient comfortable, transfer from ICUI    Progress made toward(s) clinical / shift goals:  Patient pain is manageable at rest, but aggravated with activities and movement. Patient was able to move self from bed to chair with 1 person assistance today.     Safety precautions in place, patient with 1:1 sitter at bedside   Problem: Pain - Standard  Goal: Alleviation of pain or a reduction in pain to the patient’s comfort goal  Outcome: Progressing
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
## 2021-08-18 NOTE — DISCHARGE PLANNING
REED received [facility] paperwork from ANDREA Edwards. Per Lesley Alexis [facility] paperwork should be sent to Silva Mcbride. REED made phone call to Silva to get her fax #. Left VM with callback request.

## 2021-08-18 NOTE — PROGRESS NOTES
1955: Bedside report already called by day shift RN to NOC RN who will be taking this pt upon transfer to ortho-spine floor. This RN also received report pending transfer and will be assuming care while he remains in ICU. 1:1 José Luis Briggs at bedside.    2045: Placed call to ortho charge RN to inquire regarding whether bed is ready/being cleaned. Per RN, EVS on unit but has not yet cleaned room. STAT clean requested. RN to contact me via [employer] when bed is ready for pt transport. Pt's mother updated on new room assignment.

## 2021-08-18 NOTE — CARE PLAN
The patient is Watcher - Medium risk of patient condition declining or worsening    Shift Goals  Clinical Goals: Safety  Patient Goals: Adequate pain control    Progress made toward(s) clinical / shift goals:      Problem: Knowledge Deficit - Standard  Goal: Patient and family/care givers will demonstrate understanding of plan of care, disease process/condition, diagnostic tests and medications  Note: Educated patient on POC, pain management, medication administration, ambulation, safety, diet, usage of call light, 1:1 safety sitter. Will continue to educate patient on POC accordingly.      Problem: Pain - Standard  Goal: Alleviation of pain or a reduction in pain to the patient’s comfort goal  Outcome: Progressing  Flowsheets (Taken 8/18/2021 0110)  Pain Rating Scale (NPRS): 6  Note: Medicated per MAR.     Problem: Fall Risk  Goal: Patient will remain free from falls  Outcome: Progressing  Note: Appropriate fall precautions in place.

## 2021-08-18 NOTE — PROGRESS NOTES
Pt to T310 accompanied by [employer] tech and 1:1 José Luis crouch. Updates provided via telephone to Poonam BO RN, who will be taking pt upon transfer.

## 2021-08-18 NOTE — DISCHARGE PLANNING
Filed petition to the court via [employer]. Waiting on verified petition.    Received verified petition from the court. Scanned copy of legal hold extension into pt's chart, sent copy to REED Pate.
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
## 2021-08-18 NOTE — PROGRESS NOTES
Assumed care of patient at 0100. Report received from Silva. Assessment complete.  AA&Ox4. Denies CP/SOB.  Reporting 6/10 pain. Medicated per MAR.  Educated patient regarding pharmacologic and non pharmacologic modalities for pain management.  Tolerating regular diet. Denies N/V.  + void. Last BM: PTA  Pt ambulates: NWB BLE, unable to ambulate  1:! Sitter at bedside. All needs met at this time. Call light within reach. Pt calls appropriately. Bed low and locked, non skid socks in place. Hourly rounding in place.

## 2021-08-18 NOTE — PROGRESS NOTES
4 Eyes Skin Assessment Completed by PRISCILLA Levin and PRISCILLA Zamudio.    Head: scattered scarring/ abrasions on face  Ears WDL  Nose WDL  Mouth WDL  Neck WDL  Breast/Chest: scattered abrasions  Shoulder Blades WDL  Spine WDL  (R) Arm/Elbow/Hand WDL  (L) Arm/Elbow/Hand WDL  Abdomen WDL  Groin WDL  Scrotum/Coccyx/Buttocks: dressing in place with old serosanguinous drainage   (R) Leg: immobilizer and dressing in place  (L) Leg: dressing in place  (R) Heel/Foot/Toe WDL  (L) Heel/Foot/Toe WDL          Devices In Places Pulse Ox and SCD's, immobilizer, NC       Interventions In Place Gray Ear Foams, Pillows, Q2 Turns and Pressure Redistribution Mattress    Possible Skin Injury No    Pictures Uploaded Into Epic N/A  Wound Consult Placed N/A  RN Wound Prevention Protocol Ordered No

## 2021-08-18 NOTE — DISCHARGE PLANNING
Received pt's mother's FMLA paperwork. Patient transferred out of ICU so FMLA paperwork was e-mailed to pt's new , Herlinda Johnson.

## 2021-08-18 NOTE — CARE PLAN
The patient is Stable - Low risk of patient condition declining or worsening    Shift Goals  Clinical Goals: Reduce pain, adequate rest  Patient Goals: Reduced pain  Family Goals: Keep patient comfortable, transfer from ICUI    Progress made toward(s) clinical / shift goals:    Problem: Knowledge Deficit - Standard  Goal: Patient and family/care givers will demonstrate understanding of plan of care, disease process/condition, diagnostic tests and medications  Outcome: Progressing     Problem: Pain - Standard  Goal: Alleviation of pain or a reduction in pain to the patient’s comfort goal  Outcome: Progressing     Problem: Skin Integrity  Goal: Skin integrity is maintained or improved  Outcome: Progressing     Problem: Fall Risk  Goal: Patient will remain free from falls  Outcome: Progressing     Problem: Hemodynamics  Goal: Patient's hemodynamics, fluid balance and neurologic status will be stable or improve  Outcome: Progressing       Patient is not progressing towards the following goals:    Problem: Safety - Medical Restraint  Goal: Free from restraint(s) (Restraint for Interference with Medical Device)  Outcome: Not Met
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
## 2021-08-18 NOTE — PROGRESS NOTES
"Patient is on legel hold, with 1: 1 sitter in placed. RN received in report that patient still has his personal phone although per psychiatrist Major, patient is not allow to have personal phone. This RN educated patient, and ask to put the phone away with patient other belongings, patient stated \"No, thank you I will have my mom called you\". This RN spoke to patient's mom regarding situation, patient's mom verbalize understanding, and stated \"I'll take his phone home with me\". Patient no longer has personal phone  "

## 2021-08-19 LAB
ALBUMIN SERPL BCP-MCNC: 2.9 G/DL (ref 3.2–4.9)
ALBUMIN/GLOB SERPL: 1.1 G/DL
ALP SERPL-CCNC: 52 U/L (ref 30–99)
ALT SERPL-CCNC: 31 U/L (ref 2–50)
ANION GAP SERPL CALC-SCNC: 12 MMOL/L (ref 7–16)
AST SERPL-CCNC: 78 U/L (ref 12–45)
BASOPHILS # BLD AUTO: 0.2 % (ref 0–1.8)
BASOPHILS # BLD: 0.02 K/UL (ref 0–0.12)
BILIRUB SERPL-MCNC: 0.5 MG/DL (ref 0.1–1.5)
BUN SERPL-MCNC: 7 MG/DL (ref 8–22)
CALCIUM SERPL-MCNC: 8.1 MG/DL (ref 8.5–10.5)
CHLORIDE SERPL-SCNC: 102 MMOL/L (ref 96–112)
CO2 SERPL-SCNC: 24 MMOL/L (ref 20–33)
CREAT SERPL-MCNC: 0.74 MG/DL (ref 0.5–1.4)
EOSINOPHIL # BLD AUTO: 0.01 K/UL (ref 0–0.51)
EOSINOPHIL NFR BLD: 0.1 % (ref 0–6.9)
ERYTHROCYTE [DISTWIDTH] IN BLOOD BY AUTOMATED COUNT: 44.5 FL (ref 35.9–50)
GLOBULIN SER CALC-MCNC: 2.6 G/DL (ref 1.9–3.5)
GLUCOSE SERPL-MCNC: 116 MG/DL (ref 65–99)
HCT VFR BLD AUTO: 22.7 % (ref 42–52)
HGB BLD-MCNC: 7.6 G/DL (ref 14–18)
IMM GRANULOCYTES # BLD AUTO: 0.03 K/UL (ref 0–0.11)
IMM GRANULOCYTES NFR BLD AUTO: 0.4 % (ref 0–0.9)
LYMPHOCYTES # BLD AUTO: 1.73 K/UL (ref 1–4.8)
LYMPHOCYTES NFR BLD: 20.9 % (ref 22–41)
MCH RBC QN AUTO: 31.9 PG (ref 27–33)
MCHC RBC AUTO-ENTMCNC: 33.5 G/DL (ref 33.7–35.3)
MCV RBC AUTO: 95.4 FL (ref 81.4–97.8)
MONOCYTES # BLD AUTO: 0.7 K/UL (ref 0–0.85)
MONOCYTES NFR BLD AUTO: 8.5 % (ref 0–13.4)
NEUTROPHILS # BLD AUTO: 5.79 K/UL (ref 1.82–7.42)
NEUTROPHILS NFR BLD: 69.9 % (ref 44–72)
NRBC # BLD AUTO: 0 K/UL
NRBC BLD-RTO: 0 /100 WBC
PLATELET # BLD AUTO: 140 K/UL (ref 164–446)
PMV BLD AUTO: 9.9 FL (ref 9–12.9)
POTASSIUM SERPL-SCNC: 3.8 MMOL/L (ref 3.6–5.5)
PROT SERPL-MCNC: 5.5 G/DL (ref 6–8.2)
RBC # BLD AUTO: 2.38 M/UL (ref 4.7–6.1)
SODIUM SERPL-SCNC: 138 MMOL/L (ref 135–145)
WBC # BLD AUTO: 8.3 K/UL (ref 4.8–10.8)

## 2021-08-19 PROCEDURE — 85025 COMPLETE CBC W/AUTO DIFF WBC: CPT

## 2021-08-19 PROCEDURE — 700102 HCHG RX REV CODE 250 W/ 637 OVERRIDE(OP): Performed by: NURSE PRACTITIONER

## 2021-08-19 PROCEDURE — 97535 SELF CARE MNGMENT TRAINING: CPT

## 2021-08-19 PROCEDURE — 700111 HCHG RX REV CODE 636 W/ 250 OVERRIDE (IP): Performed by: SURGERY

## 2021-08-19 PROCEDURE — 80053 COMPREHEN METABOLIC PANEL: CPT

## 2021-08-19 PROCEDURE — 36415 COLL VENOUS BLD VENIPUNCTURE: CPT

## 2021-08-19 PROCEDURE — 90791 PSYCH DIAGNOSTIC EVALUATION: CPT | Performed by: SOCIAL WORKER

## 2021-08-19 PROCEDURE — 700105 HCHG RX REV CODE 258: Performed by: SURGERY

## 2021-08-19 PROCEDURE — 99232 SBSQ HOSP IP/OBS MODERATE 35: CPT | Mod: GC | Performed by: PSYCHIATRY & NEUROLOGY

## 2021-08-19 PROCEDURE — 90834 PSYTX W PT 45 MINUTES: CPT | Performed by: SOCIAL WORKER

## 2021-08-19 PROCEDURE — 97530 THERAPEUTIC ACTIVITIES: CPT

## 2021-08-19 PROCEDURE — 700102 HCHG RX REV CODE 250 W/ 637 OVERRIDE(OP): Performed by: STUDENT IN AN ORGANIZED HEALTH CARE EDUCATION/TRAINING PROGRAM

## 2021-08-19 PROCEDURE — A9270 NON-COVERED ITEM OR SERVICE: HCPCS | Performed by: NURSE PRACTITIONER

## 2021-08-19 PROCEDURE — 700102 HCHG RX REV CODE 250 W/ 637 OVERRIDE(OP): Performed by: SURGERY

## 2021-08-19 PROCEDURE — 770001 HCHG ROOM/CARE - MED/SURG/GYN PRIV*

## 2021-08-19 PROCEDURE — A9270 NON-COVERED ITEM OR SERVICE: HCPCS | Performed by: SURGERY

## 2021-08-19 PROCEDURE — A9270 NON-COVERED ITEM OR SERVICE: HCPCS | Performed by: STUDENT IN AN ORGANIZED HEALTH CARE EDUCATION/TRAINING PROGRAM

## 2021-08-19 RX ADMIN — MORPHINE SULFATE 15 MG: 15 TABLET, FILM COATED, EXTENDED RELEASE ORAL at 17:51

## 2021-08-19 RX ADMIN — ACETAMINOPHEN 1000 MG: 500 TABLET ORAL at 06:13

## 2021-08-19 RX ADMIN — ENOXAPARIN SODIUM 30 MG: 30 INJECTION SUBCUTANEOUS at 17:51

## 2021-08-19 RX ADMIN — FLUOXETINE 20 MG: 20 CAPSULE ORAL at 06:13

## 2021-08-19 RX ADMIN — ENOXAPARIN SODIUM 30 MG: 30 INJECTION SUBCUTANEOUS at 06:14

## 2021-08-19 RX ADMIN — MORPHINE SULFATE 15 MG: 15 TABLET, FILM COATED, EXTENDED RELEASE ORAL at 06:13

## 2021-08-19 RX ADMIN — METAXALONE 800 MG: 800 TABLET ORAL at 06:13

## 2021-08-19 RX ADMIN — SODIUM CHLORIDE 125 MG: 9 INJECTION, SOLUTION INTRAVENOUS at 17:47

## 2021-08-19 RX ADMIN — METAXALONE 800 MG: 800 TABLET ORAL at 12:28

## 2021-08-19 RX ADMIN — ACETAMINOPHEN 1000 MG: 500 TABLET ORAL at 17:50

## 2021-08-19 RX ADMIN — ACETAMINOPHEN 1000 MG: 500 TABLET ORAL at 12:28

## 2021-08-19 RX ADMIN — GABAPENTIN 300 MG: 300 CAPSULE ORAL at 06:13

## 2021-08-19 RX ADMIN — METAXALONE 800 MG: 800 TABLET ORAL at 17:51

## 2021-08-19 RX ADMIN — GABAPENTIN 300 MG: 300 CAPSULE ORAL at 12:29

## 2021-08-19 RX ADMIN — GABAPENTIN 300 MG: 300 CAPSULE ORAL at 21:16

## 2021-08-19 ASSESSMENT — COGNITIVE AND FUNCTIONAL STATUS - GENERAL
WALKING IN HOSPITAL ROOM: TOTAL
STANDING UP FROM CHAIR USING ARMS: TOTAL
DAILY ACTIVITIY SCORE: 18
SUGGESTED CMS G CODE MODIFIER DAILY ACTIVITY: CK
DRESSING REGULAR LOWER BODY CLOTHING: A LOT
TOILETING: A LOT
HELP NEEDED FOR BATHING: A LOT
MOBILITY SCORE: 6
MOVING TO AND FROM BED TO CHAIR: UNABLE
MOVING FROM LYING ON BACK TO SITTING ON SIDE OF FLAT BED: UNABLE
TURNING FROM BACK TO SIDE WHILE IN FLAT BAD: UNABLE
CLIMB 3 TO 5 STEPS WITH RAILING: TOTAL
SUGGESTED CMS G CODE MODIFIER MOBILITY: CN

## 2021-08-19 ASSESSMENT — PATIENT HEALTH QUESTIONNAIRE - PHQ9
SUM OF ALL RESPONSES TO PHQ9 QUESTIONS 1 AND 2: 0
2. FEELING DOWN, DEPRESSED, IRRITABLE, OR HOPELESS: NOT AT ALL
1. LITTLE INTEREST OR PLEASURE IN DOING THINGS: NOT AT ALL

## 2021-08-19 ASSESSMENT — ENCOUNTER SYMPTOMS
FEVER: 0
EYES NEGATIVE: 1
NECK PAIN: 0
NAUSEA: 0
BACK PAIN: 0
TINGLING: 1
CHILLS: 0
CONSTITUTIONAL NEGATIVE: 1
DEPRESSION: 0
VOMITING: 0
COUGH: 0
DIARRHEA: 0
SHORTNESS OF BREATH: 0
MYALGIAS: 1
SPEECH CHANGE: 0
CONSTIPATION: 0
SENSORY CHANGE: 1
HEADACHES: 0
DIZZINESS: 0
DEPRESSION: 1
CARDIOVASCULAR NEGATIVE: 1
ABDOMINAL PAIN: 0

## 2021-08-19 ASSESSMENT — GAIT ASSESSMENTS: GAIT LEVEL OF ASSIST: UNABLE TO PARTICIPATE

## 2021-08-19 ASSESSMENT — PAIN DESCRIPTION - PAIN TYPE: TYPE: ACUTE PAIN

## 2021-08-19 ASSESSMENT — LIFESTYLE VARIABLES: SUBSTANCE_ABUSE: 0

## 2021-08-19 NOTE — THERAPY
Physical Therapy   Daily Treatment     Patient Name: Barron Armendariz  Age:  20 y.o., Sex:  male  Medical Record #: [id number]  Today's Date: 8/19/2021     Precautions: (P) Fall Risk, Non Weight Bearing Right Lower Extremity, Non Weight Bearing Left Lower Extremity, Immobilizer Right Lower Extremity    Assessment  Pt has progressed to meet all therapy goals with improvements in mobility, w/c management and caregiver/mother assistance with all. Pt demos bed mobility SPV (mother assisting), transfers (sit pivot) bed>w/c with mother assisting with LEs and SPV from PT, pt propels w/c 150 feet with 2 turns and manages brakes/leg rests/arm rests with SPV. Further education given for curb management, car transfers, w/c management and level surface transfers and pt and mother verbalized understanding and competence with all. Pt has reached functional level to safely d/c home with mother providing 24/7 SPV and assist as needed. Mother has demonstrated competence and safety with all transfers and assist with pt mobility. Pt will need wheelchair ordered/delivered prior to d/c home and it must have detachable arm rests and elevating leg rests. Will need HHPT for home d/c. Pt demos no overt fall risk or safety concerns and pt with no concerns/apprehensions for returning home. Has all assistance needs met at this time. No further skilled acute PT needed at this time. Clear to return home when medically appropriate.       Plan    Discharge secondary to goals met.    DC Equipment Recommendations:  Wheelchair (w/c with removable arm rests and elevating leg rests)  Discharge Recommendations: Recommend home health for continued physical therapy services      Subjective  Pt pleasant and agreeable to PT and anxious to d/c home soon.      Objective  Pt demos bed mobility SPV (mother assisting), transfers (sit pivot) bed>w/c with mother assisting with LEs and SPV from PT, pt propels w/c 150 feet with 2 turns and manages brakes/leg  rests/arm rests with SPV. Further education given for curb management, car transfers, w/c management and level surface transfers and pt and mother verbalized understanding and competence with all.      08/19/21 1118   Total Time Spent   Total Time Spent (Mins) 40   Charge Group   Charges  Yes   PT Therapeutic Activities 2   PT Self Care / Home Evaluation  1   Precautions   Precautions Fall Risk;Non Weight Bearing Right Lower Extremity;Non Weight Bearing Left Lower Extremity;Immobilizer Right Lower Extremity   Comments R knee locked 20 degrees flexion   Pain 0 - 10 Group   Therapist Pain Assessment During Activity;Post Activity  (high levels of pain with mobility )   Non Verbal Descriptors   Non Verbal Scale  Calm   Cognition    Cognition / Consciousness WDL   Level of Consciousness Alert   Comments Agreeable to PT session   Balance   Sitting Balance (Static) Fair   Sitting Balance (Dynamic) Fair -   Weight Shift Sitting Poor   Skilled Intervention Compensatory Strategies;Verbal Cuing;Sequencing   Comments BLE NWB   Gait Analysis   Gait Level Of Assist Unable to Participate   Weight Bearing Status BLE NWB   Bed Mobility    Supine to Sit Supervised   Sit to Supine   (up to w/c)   Scooting Supervised   Skilled Intervention Compensatory Strategies;Sequencing;Verbal Cuing   Comments flat HOB, no trapeze used, mother assisting as needed   Functional Mobility   Sit to Stand Unable to Participate   Bed, Chair, Wheelchair Transfer Supervised   Transfer Method Sit Pivot  (reverse sit pivot to w/c then mother brought legs over)   Mobility bed>w/c   Wheelchair Assist Supervised   Skilled Intervention Verbal Cuing;Sequencing;Postural Facilitation   Comments Mother assists with sit pivot transfer   How much difficulty does the patient currently have...   Turning over in bed (including adjusting bedclothes, sheets and blankets)? 1   Sitting down on and standing up from a chair with arms (e.g., wheelchair, bedside commode, etc.) 1    Moving from lying on back to sitting on the side of the bed? 1   How much help from another person does the patient currently need...   Moving to and from a bed to a chair (including a wheelchair)? 1   Need to walk in a hospital room? 1   Climbing 3-5 steps with a railing? 1   6 clicks Mobility Score 6   Activity Tolerance   Sitting in Chair up to w/c   Sitting Edge of Bed >5   Standing Unable   Comments Pain limiting   Patient / Family Goals    Patient / Family Goal #1 Go home   Goal #1 Outcome Progressing as expected   Short Term Goals    Short Term Goal # 1 Pt to demo bed mobility with SPV in 6 visits to return home safely   Goal Outcome # 1 Goal met   Short Term Goal # 2 Pt to demo sit pivot transfer bed>w/c with SPV in 6 visits    Goal Outcome # 2 Goal met   Short Term Goal # 3 Pt to propel w/c 50 feet with 2 turns with SPV in 6 visits to return home safely   Goal Outcome # 3 Goal met   Education Group   Education Provided Role of Physical Therapist   Role of Physical Therapist Patient Response Patient;Family;Acceptance;Explanation;Verbal Demonstration   Additional Comments Education for transfer training, w/c managmeent, car transfers   Anticipated Discharge Equipment and Recommendations   DC Equipment Recommendations Wheelchair  (w/c with removable arm rests and elevating leg rests)   Discharge Recommendations Recommend home health for continued physical therapy services   Interdisciplinary Plan of Care Collaboration   IDT Collaboration with  Nursing   Patient Position at End of Therapy Seated;Call Light within Reach;Tray Table within Reach;Phone within Reach;Family / Friend in Room   Collaboration Comments RN Updated   Session Information   Date / Session Number  8/19-2(DC from PT)
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
## 2021-08-19 NOTE — PROGRESS NOTES
"   Orthopaedic Progress Note    Interval changes:  Patient doing well  Dressing CDI  HV in place with 15cc output over the last 24 hours    ROS - Patient denies any new issues.  Pain well controlled.    /73   Pulse 100   Temp 36.1 °C (97 °F) (Temporal)   Resp 18   Ht 1.753 m (5' 9\")   Wt 79.4 kg (175 lb)   SpO2 96%       Patient seen and examined  No acute distress  Breathing non labored  RRR  Right hip dressing CDI, DNVI, moves all toes, cap refill <2 sec.     Recent Labs     08/17/21  1745 08/18/21  0414 08/19/21  0440   WBC 12.3* 9.9 8.3   RBC 2.83* 2.66* 2.38*   HEMOGLOBIN 9.0* 8.5* 7.6*   HEMATOCRIT 27.4* 25.3* 22.7*   MCV 96.8 95.1 95.4   MCH 31.8 32.0 31.9   MCHC 32.8* 33.6* 33.5*   RDW 46.3 45.4 44.5   PLATELETCT 141* 125* 140*   MPV 10.3 10.2 9.9       Active Hospital Problems    Diagnosis    • Multiple closed pelvic fractures with disruption of pelvic [place] ([facility]) [S32.810A]      Priority: High   • Anemia associated with acute blood loss [D62]      Priority: Medium   • Depression [F32.9]      Priority: Medium   • Closed fracture of neck of left femur ([facility]) [S72.002A]      Priority: Medium   • Knee effusion, right [M25.461]      Priority: Medium   • Hip subluxation, right, initial encounter ([facility]) [S73.001A]      Priority: Medium   • Allergy to adhesive tape [Z91.048]      Priority: Low   • Trauma [T14.90XA]      Priority: Low   • No contraindication to deep vein thrombosis (DVT) prophylaxis [Z78.9]      Priority: Low   • Acute alcohol intoxication ([facility]) [F10.929]      Priority: Low       Assessment/Plan:  Patient doing well  Non op management of right tibial plateau and left hip  Continue HV until output less than 30 cc in 24 hour period  POD#4 S/P:  1.  Open treatment with internal fixation of right transverse with posterior wall acetabulum fracture.  2.  Right hip arthrotomy and removal of incarcerated osteochondral fragment from the hip joint.  3.  Irrigation with non-excisional debridement " of right knee laceration and repair of laceration measuring about 4 cm in length.  4.  Closed treatment without manipulation, left anterior wall acetabulum fracture.  Wt bearing status -  NWB BLE RLE in hinged knee brace locked at 20 deg at all times  Wound care/Drains - HV in place  Future Procedures - none planned   Lovenox: Start 8/16, Duration-until ambulatory > 150'  Sutures/Staples out- 14 days post operatively  PT/OT-initiated  Antibiotics: completed  DVT Prophylaxis- TEDS/SCDs/Foot pumps  Baca-none  Case Coordination for Discharge Planning - Disposition rehab
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
## 2021-08-19 NOTE — PROGRESS NOTES
"Patient stated \"This is bothering me I want it remove\" while pointing to his IV. RN let patient know that if this IV is removed, we will need to get another IV. Patient agrees, and stated \"That's fine\".     1815: RN attempted to get an IV on this patient, patient refuse, and stated \"Maybe you can try later, I'll let you know.\"     1915: Made night shift RN aware that patient is without IV access.   "

## 2021-08-19 NOTE — DISCHARGE PLANNING
DINOW made phone call to Silva and left  with call back request. Need fax # to send FMLA paperwork to.    Addendum @1985  REED received message from Silva who requested this LSW email her the FMLA paper work. Silva informed this LSW that the pt will need to sign an AUGUSTINE if FMLA is for pt's family. REED met with pt at bedside for AUGUSTINE. REED emailed FMLA and AUGUSTINE to Silva.    Addendum @4139  REED spoke with HCM supervisor Yenni regarding transfer to [facility]. Yenni reported she will look into it and update this LSW.

## 2021-08-19 NOTE — CONSULTS
[facility]  Crisis/Safety Plan    Name:  Barron Armendariz  MRN:  [id number]  Date:  2021    Warning signs that a crisis may be developing for me or I may be at risk:  1) isolate myself  2) become angry  3) unable to experience calm feelings    Coping strategies I can use on my own (relaxation, physical activity, etc):  1) listen  2) remove myself from the situation  3) play with my dogs    Ways I can make my environment safe:  1) think better of myself-stop thinking negatively  2) stay sober  3) stop hanging with friends who use drugs and alcohol    Things I want to tell myself when I feel a crisis developin) Its not worth it  2) Re-think this issue  3)    People I can contact for support or distraction (and their phone numbers):  1) Mother Suad  2) Sister Alicia  3)    If I’m not able to reach my support people, or the above strategies don’t help, I can contact the following professionals, agencies, or hotlines:  1) Crisis Call Center ():  [phone] -OR- [phone]  2) Crisis Text Line ():  Text CARE TO 564223  3)   4)     Evonne Rivas, Ph.D.

## 2021-08-19 NOTE — THERAPY
Occupational Therapy  Daily Treatment     Patient Name: Barron Armendariz  Age:  20 y.o., Sex:  male  Medical Record #: [id number]  Today's Date: 8/19/2021     Precautions  Precautions: Fall Risk, Non Weight Bearing Right Lower Extremity, Non Weight Bearing Left Lower Extremity, Immobilizer Right Lower Extremity  Comments: R knee locked 20 degrees flexion    Assessment    Pt seen for OT session. Progressing with activity tolerance and txfs. Continued education for pt and mother on adaptive techniques for LB dressing/clothing mgmt during toileting, proper body mechanics and safety with txfs, DME, and provided with handout for DME/resources. [facility] on adaptive dressing technique with BNWB; reports too painful to roll side to side. Reports prefers to do a tricep press and have mother manage clothing. Encouraged to attempt ADLs w/o assist then ask for help only if needed. Continues to be limited by decreased functional mobility, activity tolerance, cognition, balance, adherence to precautions, and pain which are currently affecting pt's ability to complete ADLs/IADLs at baseline. Will continue to follow.     Plan    Continue current treatment plan.    DC Equipment Recommendations: Bed Side Commode, Tub Transfer Bench (bariatric BSC with drop arm)  Discharge Recommendations: Recommend home health for continued occupational therapy services (as long as family able to provide 24/7 assist)     Objective     08/19/21 1040   Precautions   Precautions Fall Risk;Non Weight Bearing Right Lower Extremity;Non Weight Bearing Left Lower Extremity;Immobilizer Right Lower Extremity   Comments R knee locked at 20 deg, L LE NWB 6-8 weeks, R LE NWB 12 weeks   Vitals   O2 Delivery Device None - Room Air   Pain 0 - 10 Group   Location Leg;Pelvis   Location Orientation Right   Therapist Pain Assessment Post Activity;During Activity;Nurse Notified  (not qunatified)   Cognition    Cognition / Consciousness WDL   Level of Consciousness Alert    Comments pleasant and cooperative   Passive ROM Upper Body   Passive ROM Upper Body WDL   Active ROM Upper Body   Active ROM Upper Body  WDL   Strength Upper Body   Upper Body Strength  WDL   Other Treatments   Other Treatments Provided Educated pt and mother on adaptive techniques for LB dressing/clothing mgmt during toileting, proper body mechanics and safety with txfs, DME, and provided with handout for DME/resources   Balance   Sitting Balance (Static) Fair   Sitting Balance (Dynamic) Fair -   Weight Shift Sitting Poor   Skilled Intervention Verbal Cuing;Tactile Cuing;Facilitation;Compensatory Strategies   Comments BLE NWB; limited by pain   Bed Mobility    Supine to Sit Supervised   Sit to Supine   (left in w/c)   Scooting Supervised   Skilled Intervention Verbal Cuing;Tactile Cuing;Compensatory Strategies;Facilitation   Comments no use of trapeze this session   Activities of Daily Living   Grooming   (declined need)   Lower Body Dressing Maximal Assist  (edu on adaptive technique; refused to roll side to side)   Toileting   (declined need, but edu on clothing mgmt in chair)   Skilled Intervention Verbal Cuing;Tactile Cuing;Facilitation;Compensatory Strategies   Comments edu on adaptive dressing technique with BNWB; reports too painful to roll side to side. reports prefers to do a tricep press and have mother manage clothing   How much help from another person does the patient currently need...   Putting on and taking off regular lower body clothing? 2   Bathing (including washing, rinsing, and drying)? 2   Toileting, which includes using a toilet, bedpan, or urinal? 2   Putting on and taking off regular upper body clothing? 4   Taking care of personal grooming such as brushing teeth? 4   Eating meals? 4   6 Clicks Daily Activity Score 18   Functional Mobility   Sit to Stand Unable to Participate   Bed, Chair, Wheelchair Transfer Minimal Assist  (mother lifts BLEs to assist; demo'd correctly)   Toilet  Transfers   (declined need)   Transfer Method Sit Pivot  (lateral scoot with mother's assistance)   Skilled Intervention Verbal Cuing;Tactile Cuing;Facilitation;Compensatory Strategies   Activity Tolerance   Comments limited by pain and fatigue   Patient / Family Goals   Patient / Family Goal #1 to go home   Short Term Goals   Short Term Goal # 1 toileting with SPV including clothing mgmt   Goal Outcome # 1 Progressing as expected   Short Term Goal # 2 toilet txf with Vashti   Goal Outcome # 2 Progressing as expected   Short Term Goal # 3 LB dressing with mod A   Goal Outcome # 3 Progressing as expected   Education Group   Education Provided Home Safety;Transfers;Activities of Daily Living;Pathology of bedrest;Adaptive Equipment   Home Safety Patient Response Patient;Family;Acceptance;Explanation;Handout;Verbal Demonstration;Reinforcement Needed   Transfers Patient Response Patient;Family;Acceptance;Explanation;Demonstration;Action Demonstration;Reinforcement Needed   ADL Patient Response Patient;Acceptance;Explanation;Demonstration;Action Demonstration;Reinforcement Needed   Adaptive Equipment Patient Response Patient;Acceptance;Explanation;Verbal Demonstration;Reinforcement Needed;Handout   Pathology of Bedrest Patient Response Patient;Family;Acceptance;Explanation;Verbal Demonstration;Reinforcement Needed

## 2021-08-19 NOTE — CARE PLAN
The patient is Watcher - Medium risk of patient condition declining or worsening    Shift Goals  Clinical Goals: mobilize  Patient Goals: talk to psych  Family Goals: talk to psych    Progress made toward(s) clinical / shift goals:    Problem: Knowledge Deficit - Standard  Goal: Patient and family/care givers will demonstrate understanding of plan of care, disease process/condition, diagnostic tests and medications  Outcome: Progressing     Problem: Pain - Standard  Goal: Alleviation of pain or a reduction in pain to the patient’s comfort goal  Outcome: Progressing     Problem: Fall Risk  Goal: Patient will remain free from falls  Outcome: Progressing

## 2021-08-19 NOTE — PROGRESS NOTES
Pt aaox4. Pt c/o pain- controlled with scheduled meds. +BS. Voiding w/o difficulty. NWB to BLE, skin warm, cap refill < 3 sec. RLE with hinged brace. HVAC in place, compressed. Pt up to WC with SBA. Reviewed poc with pt&mother-verbalized understanding. Call light in reach. 1:1 sitter at bedside. Psych team in to see pt- continue legal hold. Mother and father are 2 designated visitors. No cell phone or belongings- pt aware.

## 2021-08-20 LAB
ALBUMIN SERPL BCP-MCNC: 3.2 G/DL (ref 3.2–4.9)
ALBUMIN/GLOB SERPL: 1.1 G/DL
ALP SERPL-CCNC: 52 U/L (ref 30–99)
ALT SERPL-CCNC: 33 U/L (ref 2–50)
ANION GAP SERPL CALC-SCNC: 9 MMOL/L (ref 7–16)
AST SERPL-CCNC: 67 U/L (ref 12–45)
BASOPHILS # BLD AUTO: 0.3 % (ref 0–1.8)
BASOPHILS # BLD: 0.02 K/UL (ref 0–0.12)
BILIRUB SERPL-MCNC: 0.5 MG/DL (ref 0.1–1.5)
BUN SERPL-MCNC: 10 MG/DL (ref 8–22)
CALCIUM SERPL-MCNC: 8.7 MG/DL (ref 8.5–10.5)
CHLORIDE SERPL-SCNC: 105 MMOL/L (ref 96–112)
CO2 SERPL-SCNC: 26 MMOL/L (ref 20–33)
CREAT SERPL-MCNC: 0.68 MG/DL (ref 0.5–1.4)
EOSINOPHIL # BLD AUTO: 0.04 K/UL (ref 0–0.51)
EOSINOPHIL NFR BLD: 0.6 % (ref 0–6.9)
ERYTHROCYTE [DISTWIDTH] IN BLOOD BY AUTOMATED COUNT: 46.4 FL (ref 35.9–50)
GLOBULIN SER CALC-MCNC: 2.9 G/DL (ref 1.9–3.5)
GLUCOSE SERPL-MCNC: 114 MG/DL (ref 65–99)
HCT VFR BLD AUTO: 24.5 % (ref 42–52)
HGB BLD-MCNC: 8 G/DL (ref 14–18)
IMM GRANULOCYTES # BLD AUTO: 0.07 K/UL (ref 0–0.11)
IMM GRANULOCYTES NFR BLD AUTO: 1.1 % (ref 0–0.9)
LYMPHOCYTES # BLD AUTO: 1.74 K/UL (ref 1–4.8)
LYMPHOCYTES NFR BLD: 26.9 % (ref 22–41)
MCH RBC QN AUTO: 31.6 PG (ref 27–33)
MCHC RBC AUTO-ENTMCNC: 32.7 G/DL (ref 33.7–35.3)
MCV RBC AUTO: 96.8 FL (ref 81.4–97.8)
MONOCYTES # BLD AUTO: 0.75 K/UL (ref 0–0.85)
MONOCYTES NFR BLD AUTO: 11.6 % (ref 0–13.4)
NEUTROPHILS # BLD AUTO: 3.84 K/UL (ref 1.82–7.42)
NEUTROPHILS NFR BLD: 59.5 % (ref 44–72)
NRBC # BLD AUTO: 0 K/UL
NRBC BLD-RTO: 0 /100 WBC
PLATELET # BLD AUTO: 186 K/UL (ref 164–446)
PMV BLD AUTO: 9.7 FL (ref 9–12.9)
POTASSIUM SERPL-SCNC: 3.9 MMOL/L (ref 3.6–5.5)
PROT SERPL-MCNC: 6.1 G/DL (ref 6–8.2)
RBC # BLD AUTO: 2.53 M/UL (ref 4.7–6.1)
SODIUM SERPL-SCNC: 140 MMOL/L (ref 135–145)
WBC # BLD AUTO: 6.5 K/UL (ref 4.8–10.8)

## 2021-08-20 PROCEDURE — 99233 SBSQ HOSP IP/OBS HIGH 50: CPT | Mod: 95 | Performed by: STUDENT IN AN ORGANIZED HEALTH CARE EDUCATION/TRAINING PROGRAM

## 2021-08-20 PROCEDURE — 700102 HCHG RX REV CODE 250 W/ 637 OVERRIDE(OP): Performed by: NURSE PRACTITIONER

## 2021-08-20 PROCEDURE — 700102 HCHG RX REV CODE 250 W/ 637 OVERRIDE(OP): Performed by: STUDENT IN AN ORGANIZED HEALTH CARE EDUCATION/TRAINING PROGRAM

## 2021-08-20 PROCEDURE — 700111 HCHG RX REV CODE 636 W/ 250 OVERRIDE (IP): Performed by: SURGERY

## 2021-08-20 PROCEDURE — 770001 HCHG ROOM/CARE - MED/SURG/GYN PRIV*

## 2021-08-20 PROCEDURE — A9270 NON-COVERED ITEM OR SERVICE: HCPCS | Performed by: NURSE PRACTITIONER

## 2021-08-20 PROCEDURE — 700102 HCHG RX REV CODE 250 W/ 637 OVERRIDE(OP): Performed by: SURGERY

## 2021-08-20 PROCEDURE — A9270 NON-COVERED ITEM OR SERVICE: HCPCS | Performed by: STUDENT IN AN ORGANIZED HEALTH CARE EDUCATION/TRAINING PROGRAM

## 2021-08-20 PROCEDURE — 80053 COMPREHEN METABOLIC PANEL: CPT

## 2021-08-20 PROCEDURE — A9270 NON-COVERED ITEM OR SERVICE: HCPCS | Performed by: SURGERY

## 2021-08-20 PROCEDURE — 85025 COMPLETE CBC W/AUTO DIFF WBC: CPT

## 2021-08-20 PROCEDURE — 36415 COLL VENOUS BLD VENIPUNCTURE: CPT

## 2021-08-20 RX ORDER — FERROUS SULFATE 325(65) MG
325 TABLET ORAL
Status: DISCONTINUED | OUTPATIENT
Start: 2021-08-21 | End: 2021-08-24 | Stop reason: HOSPADM

## 2021-08-20 RX ADMIN — ACETAMINOPHEN 1000 MG: 500 TABLET ORAL at 18:34

## 2021-08-20 RX ADMIN — ACETAMINOPHEN 1000 MG: 500 TABLET ORAL at 12:10

## 2021-08-20 RX ADMIN — OXYCODONE HYDROCHLORIDE 10 MG: 10 TABLET ORAL at 09:40

## 2021-08-20 RX ADMIN — OXYCODONE HYDROCHLORIDE 10 MG: 10 TABLET ORAL at 14:17

## 2021-08-20 RX ADMIN — ENOXAPARIN SODIUM 30 MG: 30 INJECTION SUBCUTANEOUS at 18:34

## 2021-08-20 RX ADMIN — ACETAMINOPHEN 1000 MG: 500 TABLET ORAL at 00:07

## 2021-08-20 RX ADMIN — METAXALONE 800 MG: 800 TABLET ORAL at 12:11

## 2021-08-20 RX ADMIN — METAXALONE 800 MG: 800 TABLET ORAL at 18:34

## 2021-08-20 RX ADMIN — FLUOXETINE 20 MG: 20 CAPSULE ORAL at 09:22

## 2021-08-20 ASSESSMENT — ENCOUNTER SYMPTOMS
BACK PAIN: 0
VOMITING: 0
COUGH: 0
CONSTIPATION: 0
CHILLS: 0
DEPRESSION: 1
FEVER: 0
CARDIOVASCULAR NEGATIVE: 1
BLURRED VISION: 0
TINGLING: 1
ABDOMINAL PAIN: 0
DIZZINESS: 0
EYES NEGATIVE: 1
HEADACHES: 0
NECK PAIN: 1
CONSTITUTIONAL NEGATIVE: 1
MYALGIAS: 1
PALPITATIONS: 0
SHORTNESS OF BREATH: 0
NECK PAIN: 0
SPEECH CHANGE: 0
NAUSEA: 0
SENSORY CHANGE: 1

## 2021-08-20 ASSESSMENT — PAIN DESCRIPTION - PAIN TYPE
TYPE: ACUTE PAIN;SURGICAL PAIN
TYPE: ACUTE PAIN
TYPE: ACUTE PAIN;SURGICAL PAIN
TYPE: ACUTE PAIN;SURGICAL PAIN

## 2021-08-20 ASSESSMENT — LIFESTYLE VARIABLES
SUBSTANCE_ABUSE: 1
SUBSTANCE_ABUSE: 0

## 2021-08-20 NOTE — PROGRESS NOTES
1:1 sitter in place at bedside  Pt is A&O x4, calls appropriately   Pain is managed with current regimen per MAR   Denies nausea and SOB  Tolerating a regular diet   Pt has knee brace and dressing in place to RLE  Hemovac to the R hip  NWB BLE  Reviewed plan of care with patient, bed in lowest position and locked, pt resting comfortably now, all needs met at this time. Interventions will be executed per plan of care
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
## 2021-08-20 NOTE — CONSULTS
BRIEF INPATIENT BEHAVIORAL HEALTH NOTE      Met with patient and family at bedside.  Patient and mother report they had the impression legal hold would be released today.  Explained legal hold process to patient and mother.   Was joined by Orthopaedic PA, Sahil Reed, who talked to patient and mother about treatment concerns.  Discussed recommendations of most recent psychiatric team consults that include transfer to inpatient psychiatric facility when medically stable.  Discussed importance of adequately addressing patient's risky behavior as exacerbated by feelings of hopelessness and desire to die.  Patient endorsed feeling empty and lonely inside, including feeling abandoned by his biological father.     Writer arranged for psychiatric evaluation with tele-psych, Dr. Sierra on this date.     Writer will follow up with patient later today.        Sara VERNON
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
## 2021-08-20 NOTE — THERAPY
Occupational Therapy Note     Patient Name: Barron Armendariz  Age:  20 y.o., Sex:  male  Medical Record #: [id number]  Today's Date: 8/20/2021    Per Cheyenne Velez, there were concerns this morning from pt/family/RN that he shouldn't be getting up to bsc, recliner, or w/c w/o nsg staff. However, family has completed training with OT/PT and have demonstrated to OT that family can safely assist pt in completing lateral scoot txfs w/ mother assisting with BLEs. RN informed and note placed in treatment team sticky note     08/20/21 6069   Precautions   Precautions Fall Risk;Non Weight Bearing Right Lower Extremity;Non Weight Bearing Left Lower Extremity;Immobilizer Right Lower Extremity   Comments R knee locked at 20 deg, L LE NWB 6-8 weeks, R LE NWB 12 weeks   Other Treatments   Other Treatments Provided Per Cheyenne Velez, there were concerns this morning from pt that he shouldn't be getting up to bsc/w/c w/o nsg staff. However, family has completed training and has been cleared by OT to complete lateral scoot txfs w/ mother assisting with BLEs. RN informed and note placed in treatment team sticky note   Anticipated Discharge Equipment and Recommendations   DC Equipment Recommendations Bed Side Commode;Tub Transfer Bench  (bariatric BSC with drop arm)   Discharge Recommendations Recommend home health for continued occupational therapy services   Interdisciplinary Plan of Care Collaboration   IDT Collaboration with  Nursing;Family / Caregiver   Patient Position at End of Therapy In Bed;Bed Alarm On;Call Light within Reach;Tray Table within Reach;Phone within Reach   Collaboration Comments OT report and recs   Session Information   Date / Session Number  8/19 #2 (2/4, 8/24)   Priority 3

## 2021-08-20 NOTE — DISCHARGE PLANNING
Email received from Liseth [facility] , who reported that this pt can go to [facility] for inpatient psychiatric. If they state there are no beds, contact Liseth directly and she can arrange for a bed. Please call Liseth prior to DC to arrange transition of care ([phone]).    Addendum @3378  LSW notified by supervisor angel that for a transfer the MD has to call [facility] to request it. [facility] will then reach out to [facility] for an accepting physician and arrange transportation. LSW notified ANNIA Baca.     Orders received for DME wheelchair. LSW met with pt at bedside who requested the following referrals be sent:    1) [facility]  2) [facility]  3) [facility]    LSW faxed choice form to Xochitl BRUMFIELD.    Addendum @8375  LSW received phone call from Antonio with [facility] who reported they are processing the referral and the wheelchair will likely be delivered today.    Addendum @6704  LSW met with pt at bedside for [facility] choice. Pt reported his PCP is doctor Fagan. Pt requested the following referrals be sent:    1) [facility]  2) [facility]  3) [facility]    LSW faxed choice form to [employer]UM[facility].    Pt's mom and dad both asking for letters to provide to work. LSW provided pt's parents with letters.    Addendum @9925  Per psych note pt's legal hold is extended. LSW made phone call to insurance ANDREA Childers regarding transfer to [facility]. Liseth reported she made a mistake, the pt will not transfer to [facility] since this is in [place]. Liseth stated the pt should transfer to [facility]. Liseth reported she will try to find a contact at [facility] in case the pt is medically clear to transfer over the weekend.    Addendum @2683  LSW received VM from Liseth who reported that a good contact at [facility] over the weekend is Angeli or Umesh at [phone]. LSW made phone call to above number. Number is for [facility] inpatient psych. Pt has not been medically cleared yet on legal hold paperwork.

## 2021-08-20 NOTE — DISCHARGE PLANNING
Received Choice form at 1200  Agency/Facility Name: [facility]  Referral sent per Choice form @ 1200      LSW informed

## 2021-08-20 NOTE — PROGRESS NOTES
Spoke with Dinh VELASQUEZ and provided updates on patients anxiety, agitation, patient and family concerns, POC, and discussing legal hold expectations with family.    1024 Patient was transferred to bed without family or patient calling for assistance from staff. Patient and family members reeducated to call staff for assistance for transfer to verify that patient is safe when transfering. Family verbalized understanding. Bed and chair alarm placed. Call light within reach. 1:1 sitter in place.    1025 Pyschiatry RON Tao at bedside.    1111 Spoke with Ray LENTZ regarding patient transfers. Per PT patient is deemed safe to transfer with the help of his mom. PT to come to patients bedside.    1126 Per LCSW, Dinh VELASQUEZ to update Rigo BLAKELY. LCSW will return to patients bedside later per LCSW.

## 2021-08-20 NOTE — CARE PLAN
The patient is Watcher - Medium risk of patient condition declining or worsening    Shift Goals  Clinical Goals: Pain control, comfort, positioning  Patient Goals: Psychiatry collaboration, pain control  Family Goals: talk to psych    Progress made toward(s) clinical / shift goals:  The patient has been collaborating with psychiatry, nursing, and physical therapy this shift to promote comfort and pain control. The patient has been communicating his pain levels with nursing and requesting medication when appropriate. The patient has also been transferring to the chair and commode with assistance from his mother since being approved by physical therapy to do so. His skin is warm, dry and intact apart from his surgical sites, and he has been using overhead railing to reposition himself when necessary.       Problem: Skin Integrity  Goal: Skin integrity is maintained or improved  Outcome: Progressing     Problem: Fall Risk  Goal: Patient will remain free from falls  Outcome: Progressing

## 2021-08-20 NOTE — DISCHARGE PLANNING
Received Choice form at 1035  Agency/Facility Name: [facility]  Referral sent per Choice form @ 1035    LSW informed
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
## 2021-08-20 NOTE — PROGRESS NOTES
6825 Paged psychiatry pager for orders, updates, questions, with notification to come to bedside.  to call MD directly regarding page. Escalated and noified paging to Charge RN.    5261 Notified Smith of information pertinent to patients behavior, family concerns, POC, and further discussing legal hold expectations with family and patient. Per Evan he will notify psychiatry MD.

## 2021-08-20 NOTE — PROGRESS NOTES
"Approximately 0920, POC dicussed by Melissa and RN with sitter, family (patients mom and dad), and patient. Per APRN, the goal is for patient is for patient to be mobilized by family with transfers to bed from chair, and chair to bed. Jennifer, patients mother states \" He doesn't trust them to know his plan of care so we want to help him with that instead\". Family members verbalized understanding that this RN wants to be present for the first few transfers to determine safety. Family verbalized understanding. Call light within reach. Bed locked and in lowest position. 1:1 sitter at bedside.    0930 Paged psychiatry pager for orders, updates, questions, and notification that MD is needed at bedside.   "

## 2021-08-20 NOTE — PROGRESS NOTES
1003-Reached out to RUDY Mcqueen regarding new onset numbness on lateral R hip incision.  1030-RUDY Mcqueen in to assess patient and remove drain. Notified in person, verbalized understanding and went to patient bedside.

## 2021-08-20 NOTE — PROGRESS NOTES
1420  Reached out to Rolando Sierra to clarify if patient can refuse medications.     1422 Per Rolando Sierra patient has capacity to refuse medications.
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
## 2021-08-20 NOTE — PROGRESS NOTES
"1500 After answering patients call light, with Jennifer patients mom requesting a shower for patient. This RN went to approach patients room. Patients mom Jennifer is loudly stating in the hallway \"there is a problem, my son has not been showered in three days. I want him to shower\". Patient mom was reassured that patient can be cleaned up and that an arrangement can be made with the CNA. CNA notified.    1503 Spoke with Ortho RUDY Velez regarding a shower for patient. Per PA, patient can not wet any of their dressings. CNA notified that patients dressing are to remain dry.    1504 CNA and RN went to patients bedside and offered a bed bath, and educated that his dressing will need to remain dry. Patients mom is stating \"there has been stuff in his room for three days for a shower, and he still has not had one\". When this RN and CNA offered the patient a bed bath the patient states \"my mom can do it\". Patients mom said \"okay\" and began gathering items to help patient bathe within the room. Both this RN and CNA asked if they still wanted assistance to help patient with the bed bath. Patients mom stated \"no, I have everything I need\". Patient and family denies further needs at this time.    1520 Patient is okay to shower, per Rigo BLAKELY.  "
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
## 2021-08-20 NOTE — PROGRESS NOTES
1535 Spoke with Rigo BLAKELY regarding care of patient, legal hold, and family behaviors aggravating patients behavior toward receiving care and toward staff members. Received telephone orders for no visitors at bedside.    1604 Updated Nurse manager . Nurse Manager came to patients bedside to speak with patient and family.    1830 Per Nurse Manager, patient family agrees to follow POC and not interfere with patients care. Nurse Manager will allow patients mother stay tonight, but must continue to follow visiting hours tomorrow.     1833 Called Rigo BLAKELY, updated MD about conversation with Nurse Manager and patient with his family. Per MD, family can visit with patient after said conversation but would like to remain updated.

## 2021-08-20 NOTE — CARE PLAN
The patient is Watcher - Medium risk of patient condition declining or worsening    Shift Goals  Clinical Goals: Pain control, manage anxiety    Progress made toward(s) clinical / shift goals:    Pt states pain is managed with current regimen per MAR. Pt reminded about different techniques to manage anxiety and encouraged to voice feelings and thoughts to RN. Pt agreeable.   Problem: Knowledge Deficit - Standard  Goal: Patient and family/care givers will demonstrate understanding of plan of care, disease process/condition, diagnostic tests and medications  Outcome: Progressing     Problem: Pain - Standard  Goal: Alleviation of pain or a reduction in pain to the patient’s comfort goal  Outcome: Progressing     Problem: Fall Risk  Goal: Patient will remain free from falls  Outcome: Progressing

## 2021-08-21 LAB
ALBUMIN SERPL BCP-MCNC: 3.6 G/DL (ref 3.2–4.9)
ALBUMIN/GLOB SERPL: 1.2 G/DL
ALP SERPL-CCNC: 56 U/L (ref 30–99)
ALT SERPL-CCNC: 57 U/L (ref 2–50)
ANION GAP SERPL CALC-SCNC: 13 MMOL/L (ref 7–16)
AST SERPL-CCNC: 85 U/L (ref 12–45)
BASOPHILS # BLD AUTO: 0.3 % (ref 0–1.8)
BASOPHILS # BLD: 0.02 K/UL (ref 0–0.12)
BILIRUB SERPL-MCNC: 0.7 MG/DL (ref 0.1–1.5)
BUN SERPL-MCNC: 10 MG/DL (ref 8–22)
CALCIUM SERPL-MCNC: 9.1 MG/DL (ref 8.5–10.5)
CHLORIDE SERPL-SCNC: 104 MMOL/L (ref 96–112)
CO2 SERPL-SCNC: 23 MMOL/L (ref 20–33)
CREAT SERPL-MCNC: 0.55 MG/DL (ref 0.5–1.4)
EOSINOPHIL # BLD AUTO: 0.02 K/UL (ref 0–0.51)
EOSINOPHIL NFR BLD: 0.3 % (ref 0–6.9)
ERYTHROCYTE [DISTWIDTH] IN BLOOD BY AUTOMATED COUNT: 44.6 FL (ref 35.9–50)
GLOBULIN SER CALC-MCNC: 3 G/DL (ref 1.9–3.5)
GLUCOSE SERPL-MCNC: 120 MG/DL (ref 65–99)
HCT VFR BLD AUTO: 26.2 % (ref 42–52)
HGB BLD-MCNC: 8.6 G/DL (ref 14–18)
IMM GRANULOCYTES # BLD AUTO: 0.09 K/UL (ref 0–0.11)
IMM GRANULOCYTES NFR BLD AUTO: 1.2 % (ref 0–0.9)
LYMPHOCYTES # BLD AUTO: 1.57 K/UL (ref 1–4.8)
LYMPHOCYTES NFR BLD: 20.5 % (ref 22–41)
MCH RBC QN AUTO: 31.5 PG (ref 27–33)
MCHC RBC AUTO-ENTMCNC: 32.8 G/DL (ref 33.7–35.3)
MCV RBC AUTO: 96 FL (ref 81.4–97.8)
MONOCYTES # BLD AUTO: 0.91 K/UL (ref 0–0.85)
MONOCYTES NFR BLD AUTO: 11.9 % (ref 0–13.4)
NEUTROPHILS # BLD AUTO: 5.06 K/UL (ref 1.82–7.42)
NEUTROPHILS NFR BLD: 65.8 % (ref 44–72)
NRBC # BLD AUTO: 0.02 K/UL
NRBC BLD-RTO: 0.3 /100 WBC
PLATELET # BLD AUTO: 258 K/UL (ref 164–446)
PMV BLD AUTO: 9.5 FL (ref 9–12.9)
POTASSIUM SERPL-SCNC: 3.8 MMOL/L (ref 3.6–5.5)
PROT SERPL-MCNC: 6.6 G/DL (ref 6–8.2)
RBC # BLD AUTO: 2.73 M/UL (ref 4.7–6.1)
SODIUM SERPL-SCNC: 140 MMOL/L (ref 135–145)
WBC # BLD AUTO: 7.7 K/UL (ref 4.8–10.8)

## 2021-08-21 PROCEDURE — A9270 NON-COVERED ITEM OR SERVICE: HCPCS | Performed by: STUDENT IN AN ORGANIZED HEALTH CARE EDUCATION/TRAINING PROGRAM

## 2021-08-21 PROCEDURE — 700102 HCHG RX REV CODE 250 W/ 637 OVERRIDE(OP): Performed by: STUDENT IN AN ORGANIZED HEALTH CARE EDUCATION/TRAINING PROGRAM

## 2021-08-21 PROCEDURE — A9270 NON-COVERED ITEM OR SERVICE: HCPCS | Performed by: NURSE PRACTITIONER

## 2021-08-21 PROCEDURE — 770001 HCHG ROOM/CARE - MED/SURG/GYN PRIV*

## 2021-08-21 PROCEDURE — 80053 COMPREHEN METABOLIC PANEL: CPT

## 2021-08-21 PROCEDURE — A9270 NON-COVERED ITEM OR SERVICE: HCPCS | Performed by: SURGERY

## 2021-08-21 PROCEDURE — 700102 HCHG RX REV CODE 250 W/ 637 OVERRIDE(OP): Performed by: NURSE PRACTITIONER

## 2021-08-21 PROCEDURE — 700111 HCHG RX REV CODE 636 W/ 250 OVERRIDE (IP): Performed by: SURGERY

## 2021-08-21 PROCEDURE — 85025 COMPLETE CBC W/AUTO DIFF WBC: CPT

## 2021-08-21 PROCEDURE — 36415 COLL VENOUS BLD VENIPUNCTURE: CPT

## 2021-08-21 PROCEDURE — 700102 HCHG RX REV CODE 250 W/ 637 OVERRIDE(OP): Performed by: SURGERY

## 2021-08-21 RX ADMIN — METAXALONE 800 MG: 800 TABLET ORAL at 16:56

## 2021-08-21 RX ADMIN — FERROUS SULFATE TAB 325 MG (65 MG ELEMENTAL FE) 325 MG: 325 (65 FE) TAB at 08:31

## 2021-08-21 RX ADMIN — ENOXAPARIN SODIUM 30 MG: 30 INJECTION SUBCUTANEOUS at 04:54

## 2021-08-21 RX ADMIN — OXYCODONE 5 MG: 5 TABLET ORAL at 11:57

## 2021-08-21 RX ADMIN — METAXALONE 800 MG: 800 TABLET ORAL at 11:57

## 2021-08-21 RX ADMIN — ENOXAPARIN SODIUM 30 MG: 30 INJECTION SUBCUTANEOUS at 16:56

## 2021-08-21 RX ADMIN — FLUOXETINE 20 MG: 20 CAPSULE ORAL at 04:53

## 2021-08-21 RX ADMIN — METAXALONE 800 MG: 800 TABLET ORAL at 04:53

## 2021-08-21 RX ADMIN — OXYCODONE HYDROCHLORIDE 10 MG: 10 TABLET ORAL at 20:08

## 2021-08-21 ASSESSMENT — PAIN DESCRIPTION - PAIN TYPE
TYPE: ACUTE PAIN
TYPE: ACUTE PAIN;SURGICAL PAIN
TYPE: ACUTE PAIN

## 2021-08-21 ASSESSMENT — ENCOUNTER SYMPTOMS
BACK PAIN: 0
MYALGIAS: 1
VOMITING: 0
TINGLING: 1
CONSTIPATION: 0
SHORTNESS OF BREATH: 0
DEPRESSION: 1
CARDIOVASCULAR NEGATIVE: 1
SPEECH CHANGE: 0
EYES NEGATIVE: 1
DIZZINESS: 0
CONSTITUTIONAL NEGATIVE: 1
HEADACHES: 0
ABDOMINAL PAIN: 0
NAUSEA: 0
SENSORY CHANGE: 1
NECK PAIN: 0

## 2021-08-21 ASSESSMENT — LIFESTYLE VARIABLES: SUBSTANCE_ABUSE: 0

## 2021-08-21 NOTE — CARE PLAN
The patient is Stable - Low risk of patient condition declining or worsening    Shift Goals  Clinical Goals: Pain control, safety  Patient Goals: Discharge home  Family Goals: Discharge home, talk with psych about legal hold    Progress made toward(s) clinical / shift goals:    Problem: Knowledge Deficit - Standard  Goal: Patient and family/care givers will demonstrate understanding of plan of care, disease process/condition, diagnostic tests and medications  Outcome: Progressing  Note: Patient able to communicate needs effectively. Plan of care updated to patient and on whiteboard. All questions answered at this time.      Problem: Pain - Standard  Goal: Alleviation of pain or a reduction in pain to the patient’s comfort goal  Outcome: Progressing  Note: Patient thomas to rate pain on a 0/10 scale. Pain being controlled with ice packs and rest. Patient educated about prn pain medication.       Patient is not progressing towards the following goals:

## 2021-08-21 NOTE — PROGRESS NOTES
"   Orthopaedic Progress Note    Interval changes:  Patient doing well  Right hip dressing changed incision without issue  HV removed yesterday    ROS - Patient denies any new issues.  Pain well controlled.    /79   Pulse 88   Temp 36.7 °C (98.1 °F) (Temporal)   Resp 20   Ht 1.753 m (5' 9\")   Wt 79.4 kg (175 lb)   SpO2 100%     Patient seen and examined  No acute distress  Breathing non labored  RRR  Right hip dressing changed and HV removed, surgical incision is well approximated and is dry and clean.  There is no erythema, induration, or signs of infection, DNVI, moves all toes, cap refill <2 sec.     Recent Labs     08/19/21  0440 08/20/21  0541 08/21/21  0701   WBC 8.3 6.5 7.7   RBC 2.38* 2.53* 2.73*   HEMOGLOBIN 7.6* 8.0* 8.6*   HEMATOCRIT 22.7* 24.5* 26.2*   MCV 95.4 96.8 96.0   MCH 31.9 31.6 31.5   MCHC 33.5* 32.7* 32.8*   RDW 44.5 46.4 44.6   PLATELETCT 140* 186 258   MPV 9.9 9.7 9.5     Active Hospital Problems    Diagnosis    • Multiple closed pelvic fractures with disruption of pelvic [place] [facility] [S32.810A]      Priority: High   • Anemia associated with acute blood loss [D62]      Priority: Medium   • Depression [F32.9]      Priority: Medium   • Closed fracture of neck of left femur ([facility]) [S72.002A]      Priority: Medium   • Knee effusion, right [M25.461]      Priority: Medium   • Hip subluxation, right, initial encounter ([facility]) [S73.001A]      Priority: Medium   • Allergy to adhesive tape [Z91.048]      Priority: Low   • Trauma [T14.90XA]      Priority: Low   • No contraindication to deep vein thrombosis (DVT) prophylaxis [Z78.9]      Priority: Low   • Acute alcohol intoxication ([facility]) [F10.929]      Priority: Low     Assessment/Plan:  Patient doing well  Non op management of right tibial plateau and left hip  HV removed  POD#6 S/P:  1.  Open treatment with internal fixation of right transverse with posterior wall acetabulum fracture.  2.  Right hip arthrotomy and removal of incarcerated " osteochondral fragment from the hip joint.  3.  Irrigation with non-excisional debridement of right knee laceration and repair of laceration measuring about 4 cm in length.  4.  Closed treatment without manipulation, left anterior wall acetabulum fracture.  Wt bearing status -  NWB BLE RLE in hinged knee brace locked at 20 deg at all times  Wound care/Drains - HV removed, right hip dressing changed every other day by nursing  Future Procedures - none planned   Lovenox: Start 8/16, Duration-until ambulatory > 150'  Sutures/Staples out- 14 days post operatively  PT/OT-initiated  Antibiotics: completed  DVT Prophylaxis- TEDS/SCDs/Foot pumps  Baca-none  Case Coordination for Discharge Planning - Disposition rehab

## 2021-08-21 NOTE — PROGRESS NOTES
Patient and mom requesting to speak with primary/ charge/ supervisor regarding legal hold. Mother requesting to see court order. Writer explained to pt and mother that once a Legal hold 2000 is signed by a MD, that a psychiatrist assesses the patient whether it should be extended and if the pt is allowed certain privileges (mother staying a night, family allowed to visit pt).    Writer spoke about same to supervisor. When writer went back into patients room, mother was on phone and teary, pt slightly irritated stating they will let us know if they need to speak with anyone else. Supervisor not needed at this time.     Writer assured both pt and mother that if they need any assistance or refreshments to call. Call light in reach, sitter at bedside.

## 2021-08-21 NOTE — CARE PLAN
Problem: Knowledge Deficit - Standard  Goal: Patient and family/care givers will demonstrate understanding of plan of care, disease process/condition, diagnostic tests and medications  Outcome: Progressing     Problem: Pain - Standard  Goal: Alleviation of pain or a reduction in pain to the patient’s comfort goal  Outcome: Progressing     The patient is Stable - Low risk of patient condition declining or worsening    Shift Goals  Clinical Goals: pain control; safety  Patient Goals: go home despite legal hold  Family Goals: discharge to home    Progress made toward(s) clinical / shift goals:  updated patient and family regarding legal hold and course of stay; pain assessed regularly, patient declining pain medication stating his pain is under control and not bothering him at all    Patient is not progressing towards the following goals:
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
## 2021-08-22 LAB
ALBUMIN SERPL BCP-MCNC: 3.5 G/DL (ref 3.2–4.9)
ALBUMIN/GLOB SERPL: 1.1 G/DL
ALP SERPL-CCNC: 64 U/L (ref 30–99)
ALT SERPL-CCNC: 73 U/L (ref 2–50)
ANION GAP SERPL CALC-SCNC: 10 MMOL/L (ref 7–16)
AST SERPL-CCNC: 75 U/L (ref 12–45)
BASOPHILS # BLD AUTO: 0.2 % (ref 0–1.8)
BASOPHILS # BLD: 0.02 K/UL (ref 0–0.12)
BILIRUB SERPL-MCNC: 0.8 MG/DL (ref 0.1–1.5)
BUN SERPL-MCNC: 11 MG/DL (ref 8–22)
CALCIUM SERPL-MCNC: 9.2 MG/DL (ref 8.5–10.5)
CHLORIDE SERPL-SCNC: 103 MMOL/L (ref 96–112)
CO2 SERPL-SCNC: 26 MMOL/L (ref 20–33)
CREAT SERPL-MCNC: 0.61 MG/DL (ref 0.5–1.4)
EOSINOPHIL # BLD AUTO: 0.02 K/UL (ref 0–0.51)
EOSINOPHIL NFR BLD: 0.2 % (ref 0–6.9)
ERYTHROCYTE [DISTWIDTH] IN BLOOD BY AUTOMATED COUNT: 44.3 FL (ref 35.9–50)
GLOBULIN SER CALC-MCNC: 3.3 G/DL (ref 1.9–3.5)
GLUCOSE SERPL-MCNC: 117 MG/DL (ref 65–99)
HCT VFR BLD AUTO: 27.4 % (ref 42–52)
HGB BLD-MCNC: 8.9 G/DL (ref 14–18)
IMM GRANULOCYTES # BLD AUTO: 0.19 K/UL (ref 0–0.11)
IMM GRANULOCYTES NFR BLD AUTO: 1.7 % (ref 0–0.9)
LYMPHOCYTES # BLD AUTO: 1.98 K/UL (ref 1–4.8)
LYMPHOCYTES NFR BLD: 18 % (ref 22–41)
MCH RBC QN AUTO: 31.4 PG (ref 27–33)
MCHC RBC AUTO-ENTMCNC: 32.5 G/DL (ref 33.7–35.3)
MCV RBC AUTO: 96.8 FL (ref 81.4–97.8)
MONOCYTES # BLD AUTO: 1.52 K/UL (ref 0–0.85)
MONOCYTES NFR BLD AUTO: 13.8 % (ref 0–13.4)
NEUTROPHILS # BLD AUTO: 7.29 K/UL (ref 1.82–7.42)
NEUTROPHILS NFR BLD: 66.1 % (ref 44–72)
NRBC # BLD AUTO: 0.02 K/UL
NRBC BLD-RTO: 0.2 /100 WBC
PLATELET # BLD AUTO: 301 K/UL (ref 164–446)
PMV BLD AUTO: 9.2 FL (ref 9–12.9)
POTASSIUM SERPL-SCNC: 3.9 MMOL/L (ref 3.6–5.5)
PROT SERPL-MCNC: 6.8 G/DL (ref 6–8.2)
RBC # BLD AUTO: 2.83 M/UL (ref 4.7–6.1)
SODIUM SERPL-SCNC: 139 MMOL/L (ref 135–145)
WBC # BLD AUTO: 11 K/UL (ref 4.8–10.8)

## 2021-08-22 PROCEDURE — 700102 HCHG RX REV CODE 250 W/ 637 OVERRIDE(OP): Performed by: NURSE PRACTITIONER

## 2021-08-22 PROCEDURE — 80053 COMPREHEN METABOLIC PANEL: CPT

## 2021-08-22 PROCEDURE — 770001 HCHG ROOM/CARE - MED/SURG/GYN PRIV*

## 2021-08-22 PROCEDURE — 700102 HCHG RX REV CODE 250 W/ 637 OVERRIDE(OP): Performed by: SURGERY

## 2021-08-22 PROCEDURE — A9270 NON-COVERED ITEM OR SERVICE: HCPCS | Performed by: SURGERY

## 2021-08-22 PROCEDURE — 700102 HCHG RX REV CODE 250 W/ 637 OVERRIDE(OP): Performed by: STUDENT IN AN ORGANIZED HEALTH CARE EDUCATION/TRAINING PROGRAM

## 2021-08-22 PROCEDURE — 36415 COLL VENOUS BLD VENIPUNCTURE: CPT

## 2021-08-22 PROCEDURE — A9270 NON-COVERED ITEM OR SERVICE: HCPCS | Performed by: STUDENT IN AN ORGANIZED HEALTH CARE EDUCATION/TRAINING PROGRAM

## 2021-08-22 PROCEDURE — 700111 HCHG RX REV CODE 636 W/ 250 OVERRIDE (IP): Performed by: SURGERY

## 2021-08-22 PROCEDURE — A9270 NON-COVERED ITEM OR SERVICE: HCPCS | Performed by: NURSE PRACTITIONER

## 2021-08-22 PROCEDURE — 85025 COMPLETE CBC W/AUTO DIFF WBC: CPT

## 2021-08-22 RX ADMIN — DOCUSATE SODIUM 100 MG: 100 CAPSULE, LIQUID FILLED ORAL at 16:56

## 2021-08-22 RX ADMIN — ENOXAPARIN SODIUM 30 MG: 30 INJECTION SUBCUTANEOUS at 16:56

## 2021-08-22 RX ADMIN — FLUOXETINE 20 MG: 20 CAPSULE ORAL at 05:13

## 2021-08-22 RX ADMIN — ENOXAPARIN SODIUM 30 MG: 30 INJECTION SUBCUTANEOUS at 05:13

## 2021-08-22 RX ADMIN — METAXALONE 800 MG: 800 TABLET ORAL at 05:13

## 2021-08-22 RX ADMIN — OXYCODONE 5 MG: 5 TABLET ORAL at 12:22

## 2021-08-22 RX ADMIN — METAXALONE 800 MG: 800 TABLET ORAL at 16:56

## 2021-08-22 RX ADMIN — ONDANSETRON 4 MG: 4 TABLET, ORALLY DISINTEGRATING ORAL at 12:22

## 2021-08-22 RX ADMIN — OXYCODONE HYDROCHLORIDE 10 MG: 10 TABLET ORAL at 20:28

## 2021-08-22 RX ADMIN — METAXALONE 800 MG: 800 TABLET ORAL at 11:24

## 2021-08-22 RX ADMIN — FERROUS SULFATE TAB 325 MG (65 MG ELEMENTAL FE) 325 MG: 325 (65 FE) TAB at 07:58

## 2021-08-22 ASSESSMENT — ENCOUNTER SYMPTOMS
HEADACHES: 0
SENSORY CHANGE: 1
DEPRESSION: 1
SPEECH CHANGE: 0
NAUSEA: 0
NECK PAIN: 0
TINGLING: 1
CARDIOVASCULAR NEGATIVE: 1
EYES NEGATIVE: 1
BACK PAIN: 0
CONSTITUTIONAL NEGATIVE: 1
DIZZINESS: 0
CONSTIPATION: 0
MYALGIAS: 1
SHORTNESS OF BREATH: 0
VOMITING: 0
ABDOMINAL PAIN: 0

## 2021-08-22 ASSESSMENT — PAIN DESCRIPTION - PAIN TYPE
TYPE: ACUTE PAIN;SURGICAL PAIN

## 2021-08-22 ASSESSMENT — LIFESTYLE VARIABLES: SUBSTANCE_ABUSE: 0

## 2021-08-22 NOTE — PROGRESS NOTES
Family requesting to have a fan in patient's room.  Per Psychiatry Rolando Sierra, OK for patient to have fan if it is okay with unit protocol and to continue with sitter.  Per Charge RN, OK for patient to have fan in room.
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
## 2021-08-22 NOTE — PROGRESS NOTES
"   Orthopaedic Progress Note    Interval changes:  Patient doing well    ROS - Patient denies any new issues.  Pain well controlled.    /81   Pulse 99   Temp 36.8 °C (98.2 °F) (Temporal)   Resp 16   Ht 1.753 m (5' 9\")   Wt 79.4 kg (175 lb)   SpO2 97%     Patient seen and examined  No acute distress  Breathing non labored  RRR  Right hip dressing changed and HV removed, surgical incision is well approximated and is dry and clean.  There is no erythema, induration, or signs of infection, DNVI, moves all toes, cap refill <2 sec.     Recent Labs     08/20/21  0541 08/21/21  0701 08/22/21  0635   WBC 6.5 7.7 11.0*   RBC 2.53* 2.73* 2.83*   HEMOGLOBIN 8.0* 8.6* 8.9*   HEMATOCRIT 24.5* 26.2* 27.4*   MCV 96.8 96.0 96.8   MCH 31.6 31.5 31.4   MCHC 32.7* 32.8* 32.5*   RDW 46.4 44.6 44.3   PLATELETCT 186 258 301   MPV 9.7 9.5 9.2     Active Hospital Problems    Diagnosis    • Multiple closed pelvic fractures with disruption of pelvic [place] ([facility]) [S32.810A]      Priority: High   • Anemia associated with acute blood loss [D62]      Priority: Medium   • Depression [F32.9]      Priority: Medium   • Closed fracture of neck of left femur ([facility]) [S72.002A]      Priority: Medium   • Knee effusion, right [M25.461]      Priority: Medium   • Hip subluxation, right, initial encounter ([facility]) [S73.001A]      Priority: Medium   • Allergy to adhesive tape [Z91.048]      Priority: Low   • Trauma [T14.90XA]      Priority: Low   • No contraindication to deep vein thrombosis (DVT) prophylaxis [Z78.9]      Priority: Low   • Acute alcohol intoxication ([facility]) [F10.929]      Priority: Low     Assessment/Plan:  Patient doing well  Non op management of right tibial plateau and left hip  HV removed  POD#7 S/P:  1.  Open treatment with internal fixation of right transverse with posterior wall acetabulum fracture.  2.  Right hip arthrotomy and removal of incarcerated osteochondral fragment from the hip joint.  3.  Irrigation with non-excisional " debridement of right knee laceration and repair of laceration measuring about 4 cm in length.  4.  Closed treatment without manipulation, left anterior wall acetabulum fracture.  Wt bearing status -  NWB BLE RLE in hinged knee brace locked at 20 deg at all times  Wound care: right hip dressing changed every other day by nursing  Future Procedures - none planned   Lovenox: Start 8/16, Duration-until ambulatory > 150'  Sutures/Staples out- 14 days post operatively  PT/OT-initiated  Antibiotics: completed  DVT Prophylaxis- TEDS/SCDs/Foot pumps  Baca-none  Case Coordination for Discharge Planning - Disposition rehab

## 2021-08-22 NOTE — CARE PLAN
The patient is Stable - Low risk of patient condition declining or worsening    Shift Goals  Clinical Goals: Safety  Patient Goals: Bed bath, laps in halls with wheelchair  Family Goals: no family present    Progress made toward(s) clinical / shift goals:    Problem: Knowledge Deficit - Standard  Goal: Patient and family/care givers will demonstrate understanding of plan of care, disease process/condition, diagnostic tests and medications  Outcome: Progressing  Note: Patient able to communicate needs effectively. Plan of care updated to patient and on whiteboard. All questions answered at this time.      Problem: Fall Risk  Goal: Patient will remain free from falls  Outcome: Progressing  Note: Non-slip socks are on, bed is locked and in lowest position, personal belongings are within reach, room is free of clutter and spills, call light is in place. Patient educated on how to use the call light and how to call for assistance. Patient verbalized understanding.         Patient is not progressing towards the following goals:

## 2021-08-22 NOTE — CARE PLAN
Problem: Pain - Standard  Goal: Alleviation of pain or a reduction in pain to the patient’s comfort goal  Outcome: Progressing     Problem: Fall Risk  Goal: Patient will remain free from falls  Outcome: Progressing     The patient is Stable - Low risk of patient condition declining or worsening    Shift Goals  Clinical Goals: pain control; est  Patient Goals: pain control; rest  Family Goals: no family present    Progress made toward(s) clinical / shift goals:   patient's pain is adequately controlled with PRN analgesics, follow up pain reassessments done at appropriate intervals; patient verbalizes understanding of fall risk, uses call light appropriately, rounded on frequently for any needs    Patient is not progressing towards the following goals:

## 2021-08-23 ENCOUNTER — PHARMACY VISIT (OUTPATIENT)
Dept: PHARMACY | Facility: MEDICAL CENTER | Age: 21
End: 2021-08-23
Payer: COMMERCIAL

## 2021-08-23 LAB
ALBUMIN SERPL BCP-MCNC: 3.7 G/DL (ref 3.2–4.9)
ALBUMIN/GLOB SERPL: 1.2 G/DL
ALP SERPL-CCNC: 65 U/L (ref 30–99)
ALT SERPL-CCNC: 69 U/L (ref 2–50)
ANION GAP SERPL CALC-SCNC: 10 MMOL/L (ref 7–16)
AST SERPL-CCNC: 48 U/L (ref 12–45)
BASOPHILS # BLD AUTO: 0.4 % (ref 0–1.8)
BASOPHILS # BLD: 0.04 K/UL (ref 0–0.12)
BILIRUB SERPL-MCNC: 0.7 MG/DL (ref 0.1–1.5)
BUN SERPL-MCNC: 12 MG/DL (ref 8–22)
CALCIUM SERPL-MCNC: 9.4 MG/DL (ref 8.5–10.5)
CHLORIDE SERPL-SCNC: 101 MMOL/L (ref 96–112)
CO2 SERPL-SCNC: 28 MMOL/L (ref 20–33)
CREAT SERPL-MCNC: 0.7 MG/DL (ref 0.5–1.4)
EOSINOPHIL # BLD AUTO: 0.06 K/UL (ref 0–0.51)
EOSINOPHIL NFR BLD: 0.6 % (ref 0–6.9)
ERYTHROCYTE [DISTWIDTH] IN BLOOD BY AUTOMATED COUNT: 45.2 FL (ref 35.9–50)
GLOBULIN SER CALC-MCNC: 3.1 G/DL (ref 1.9–3.5)
GLUCOSE SERPL-MCNC: 117 MG/DL (ref 65–99)
HCT VFR BLD AUTO: 28.1 % (ref 42–52)
HGB BLD-MCNC: 9.1 G/DL (ref 14–18)
IMM GRANULOCYTES # BLD AUTO: 0.2 K/UL (ref 0–0.11)
IMM GRANULOCYTES NFR BLD AUTO: 1.9 % (ref 0–0.9)
LYMPHOCYTES # BLD AUTO: 2.14 K/UL (ref 1–4.8)
LYMPHOCYTES NFR BLD: 20.1 % (ref 22–41)
MCH RBC QN AUTO: 31.7 PG (ref 27–33)
MCHC RBC AUTO-ENTMCNC: 32.4 G/DL (ref 33.7–35.3)
MCV RBC AUTO: 97.9 FL (ref 81.4–97.8)
MONOCYTES # BLD AUTO: 1.43 K/UL (ref 0–0.85)
MONOCYTES NFR BLD AUTO: 13.5 % (ref 0–13.4)
NEUTROPHILS # BLD AUTO: 6.76 K/UL (ref 1.82–7.42)
NEUTROPHILS NFR BLD: 63.5 % (ref 44–72)
NRBC # BLD AUTO: 0.02 K/UL
NRBC BLD-RTO: 0.2 /100 WBC
PLATELET # BLD AUTO: 387 K/UL (ref 164–446)
PMV BLD AUTO: 9.2 FL (ref 9–12.9)
POTASSIUM SERPL-SCNC: 3.8 MMOL/L (ref 3.6–5.5)
PROT SERPL-MCNC: 6.8 G/DL (ref 6–8.2)
RBC # BLD AUTO: 2.87 M/UL (ref 4.7–6.1)
SODIUM SERPL-SCNC: 139 MMOL/L (ref 135–145)
WBC # BLD AUTO: 10.6 K/UL (ref 4.8–10.8)

## 2021-08-23 PROCEDURE — 770001 HCHG ROOM/CARE - MED/SURG/GYN PRIV*

## 2021-08-23 PROCEDURE — A9270 NON-COVERED ITEM OR SERVICE: HCPCS | Performed by: STUDENT IN AN ORGANIZED HEALTH CARE EDUCATION/TRAINING PROGRAM

## 2021-08-23 PROCEDURE — A9270 NON-COVERED ITEM OR SERVICE: HCPCS | Performed by: NURSE PRACTITIONER

## 2021-08-23 PROCEDURE — 700102 HCHG RX REV CODE 250 W/ 637 OVERRIDE(OP): Performed by: NURSE PRACTITIONER

## 2021-08-23 PROCEDURE — A9270 NON-COVERED ITEM OR SERVICE: HCPCS | Performed by: SURGERY

## 2021-08-23 PROCEDURE — 700102 HCHG RX REV CODE 250 W/ 637 OVERRIDE(OP): Performed by: STUDENT IN AN ORGANIZED HEALTH CARE EDUCATION/TRAINING PROGRAM

## 2021-08-23 PROCEDURE — RXMED WILLOW AMBULATORY MEDICATION CHARGE: Performed by: NURSE PRACTITIONER

## 2021-08-23 PROCEDURE — 99233 SBSQ HOSP IP/OBS HIGH 50: CPT | Mod: 95 | Performed by: STUDENT IN AN ORGANIZED HEALTH CARE EDUCATION/TRAINING PROGRAM

## 2021-08-23 PROCEDURE — 700102 HCHG RX REV CODE 250 W/ 637 OVERRIDE(OP): Performed by: SURGERY

## 2021-08-23 PROCEDURE — 80053 COMPREHEN METABOLIC PANEL: CPT

## 2021-08-23 PROCEDURE — 36415 COLL VENOUS BLD VENIPUNCTURE: CPT

## 2021-08-23 PROCEDURE — 700111 HCHG RX REV CODE 636 W/ 250 OVERRIDE (IP): Performed by: SURGERY

## 2021-08-23 PROCEDURE — 85025 COMPLETE CBC W/AUTO DIFF WBC: CPT

## 2021-08-23 RX ORDER — METAXALONE 800 MG/1
800 TABLET ORAL 3 TIMES DAILY PRN
Qty: 15 TABLET | Refills: 0 | Status: SHIPPED | OUTPATIENT
Start: 2021-08-23

## 2021-08-23 RX ORDER — ACETAMINOPHEN 500 MG
1000 TABLET ORAL EVERY 6 HOURS PRN
Status: DISCONTINUED | OUTPATIENT
Start: 2021-08-23 | End: 2021-08-24 | Stop reason: HOSPADM

## 2021-08-23 RX ORDER — FERROUS SULFATE 325(65) MG
325 TABLET ORAL
Qty: 30 TABLET | COMMUNITY
Start: 2021-08-24

## 2021-08-23 RX ORDER — OXYCODONE HYDROCHLORIDE 5 MG/1
2.5-5 TABLET ORAL EVERY 4 HOURS PRN
Qty: 30 TABLET | Refills: 0 | Status: SHIPPED | OUTPATIENT
Start: 2021-08-23 | End: 2021-08-30

## 2021-08-23 RX ORDER — FLUOXETINE HYDROCHLORIDE 20 MG/1
20 CAPSULE ORAL DAILY
Qty: 30 CAPSULE | Refills: 1 | Status: SHIPPED | OUTPATIENT
Start: 2021-08-24

## 2021-08-23 RX ORDER — ACETAMINOPHEN 500 MG
1000 TABLET ORAL EVERY 6 HOURS PRN
Qty: 30 TABLET | Refills: 0 | COMMUNITY
Start: 2021-08-23

## 2021-08-23 RX ADMIN — ACETAMINOPHEN 1000 MG: 500 TABLET ORAL at 18:04

## 2021-08-23 RX ADMIN — ENOXAPARIN SODIUM 30 MG: 30 INJECTION SUBCUTANEOUS at 05:21

## 2021-08-23 RX ADMIN — METAXALONE 800 MG: 800 TABLET ORAL at 12:28

## 2021-08-23 RX ADMIN — OXYCODONE 5 MG: 5 TABLET ORAL at 12:26

## 2021-08-23 RX ADMIN — FLUOXETINE 20 MG: 20 CAPSULE ORAL at 05:21

## 2021-08-23 RX ADMIN — METAXALONE 800 MG: 800 TABLET ORAL at 05:21

## 2021-08-23 RX ADMIN — OXYCODONE 5 MG: 5 TABLET ORAL at 05:21

## 2021-08-23 RX ADMIN — DOCUSATE SODIUM 100 MG: 100 CAPSULE, LIQUID FILLED ORAL at 17:49

## 2021-08-23 RX ADMIN — FERROUS SULFATE TAB 325 MG (65 MG ELEMENTAL FE) 325 MG: 325 (65 FE) TAB at 09:29

## 2021-08-23 RX ADMIN — ENOXAPARIN SODIUM 30 MG: 30 INJECTION SUBCUTANEOUS at 17:50

## 2021-08-23 ASSESSMENT — ENCOUNTER SYMPTOMS
SHORTNESS OF BREATH: 0
NECK PAIN: 0
TINGLING: 1
MYALGIAS: 1
NAUSEA: 0
DEPRESSION: 0
DEPRESSION: 1
CONSTIPATION: 0
HEADACHES: 0
EYES NEGATIVE: 1
CARDIOVASCULAR NEGATIVE: 1
BLURRED VISION: 0
MYALGIAS: 0
PALPITATIONS: 0
BACK PAIN: 0
SENSORY CHANGE: 1
CHILLS: 0
DIZZINESS: 0
SPEECH CHANGE: 0
NERVOUS/ANXIOUS: 0
VOMITING: 0
ABDOMINAL PAIN: 0
CONSTITUTIONAL NEGATIVE: 1
FEVER: 0
COUGH: 0

## 2021-08-23 ASSESSMENT — LIFESTYLE VARIABLES: SUBSTANCE_ABUSE: 0

## 2021-08-23 ASSESSMENT — PAIN DESCRIPTION - PAIN TYPE
TYPE: ACUTE PAIN
TYPE: ACUTE PAIN;SURGICAL PAIN
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
## 2021-08-23 NOTE — DISCHARGE PLANNING
Received Choice form at 1400  Agency/Facility Name: [facility]  Referral sent per Choice form @ 1400    LSW informed

## 2021-08-23 NOTE — CARE PLAN
Problem: Pain - Standard  Goal: Alleviation of pain or a reduction in pain to the patient’s comfort goal  Outcome: Progressing     Patient educated on 0-10 pain scale, and non-pharmacological pain control. Encouraged to verbalize and contact staff when in pain.  Administered PRN medication as needed.      Problem: Skin Integrity  Goal: Skin integrity is maintained or improved  Outcome: Progressing    Inspect skin under immobilizer, patient up and out of bed to wheelchair.      Problem: Fall Risk  Goal: Patient will remain free from falls  Outcome: Progressing  Patient educated on medication adverse effects, asked to use call light for assistance, bed left in lowest position, call light within reach, two bed rails up.      The patient is Stable - Low risk of patient condition declining or worsening    Shift Goals  Clinical Goals: safety  Patient Goals: bed bath, laps in the halls  Family Goals: to go home    Progress made toward(s) clinical / shift goals:  Patient states understanding of mediation side effects, the importance of call light use prior to ambulating, and importance of daily skin inspections under immobilizer.

## 2021-08-23 NOTE — DISCHARGE PLANNING
Anticipated Discharge Disposition: [facility] inpatient psych vs home    Action: LSW spoke with trauma NP Melissa regarding this pt. Per Melissa pt and pt's family are requesting to speak with someone about the legal hold process. Melissa also reported that the process has been initiated to transfer the pt to [facility], however per RTOC this morning [facility] still does not have any beds today for this pt.    LSW sent message to OCTAVIANO Stovall regarding who the pt can talk to about legal hold process. Sia reported she is unable to speak with pt's family. Per supervisor Ray Stovall should explain the process to this LSW and this LSW can relay the information to the pt.    Addendum @1037  Devonte Stovall, once pt is medically clear the MD needs to sign the medical clearance section of the legal hold and then pt is added to court list so he can speak with the . Public defenders speak with the pt's on Tuesday and then on Wednesday pt's meet with the court doctors who decide if legal hold can be discontinued. If legal hold is discontinued on Wednesday pt can go home Thursday. LSW explained the above to pt and pt's mom. LSW sent voalte message to Trauma RUDY Torres regarding medical clearance for legal hold.    Addendum @9612  Melissa completed medical clearance section of legal hold. LSW faxed it to OCTAVIANO Stovall.    Addendum @3700  LSW received phone call from pt's insurance ANDREA Childers [phone]. LSW provided Liseth with update on DC planning. Liseth stated she will look into [facility] having a bed.    Addendum @6675  LSW received phone call from Marisa [phone] with [facility]. Marisa reported that they need a referral faxed to them at [phone]. LSW requested OCTAVIANO Leung fax them a referral.    Addendum @0702  RN notified this LSW that legal hold was discontinued. LSW notified OCTAVIANO Leung to send referral to [facility]. Per PRISCILLA Muirro pt does not have WC. OCTAVIANO Leung following up with Jorge regarding WC  delivery.    Addendum @1557  LSW notified by PRISCILLA Rojo that the psych doctor is unable to complete the certificate of release form to discontinue the legal hold due to it being a tele visit. LSW sent message to OCTAVIANO Stovall to find out if another doctor is able to fill out the certificate of release to discontinue the legal hold. LSW spoke with supervisor Vicky who reported Sia would know, however she leaves at 1500. Sia will be back tomorrow morning.    Barriers to Discharge: HHC acceptance and DME delivery, legal hold paperwork.    Plan: Care coordination will follow up with C referral and DME company. LSW will follow up with Sia tomorrow regarding legal hold paperwork.

## 2021-08-23 NOTE — CARE PLAN
The patient is Stable - Low risk of patient condition declining or worsening    Shift Goals  Clinical Goals: Safety  Patient Goals: Bed bath, laps in halls with wheelchair  Family Goals: no family present    Progress made toward(s) clinical / shift goals:  Patient dressings clean, dry, intact. Immobilizer to right knee. PRN oxy for pain. NWB to BLE. 1:1 sitter for safety. VSS. Will continue to monitor.    Patient is not progressing towards the following goals:      Problem: Knowledge Deficit - Standard  Goal: Patient and family/care givers will demonstrate understanding of plan of care, disease process/condition, diagnostic tests and medications  Outcome: Progressing     Problem: Pain - Standard  Goal: Alleviation of pain or a reduction in pain to the patient’s comfort goal  Outcome: Progressing     Problem: Skin Integrity  Goal: Skin integrity is maintained or improved  Outcome: Progressing     Problem: Fall Risk  Goal: Patient will remain free from falls  Outcome: Progressing

## 2021-08-23 NOTE — DISCHARGE PLANNING
Agency/Facility Name: [facility]  Spoke To: Margaret  Outcome: Re faxed referral.    LSW informed
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
## 2021-08-24 ENCOUNTER — PHARMACY VISIT (OUTPATIENT)
Dept: PHARMACY | Facility: MEDICAL CENTER | Age: 21
End: 2021-08-24
Payer: COMMERCIAL

## 2021-08-24 VITALS
TEMPERATURE: 97.8 F | RESPIRATION RATE: 18 BRPM | HEIGHT: 69 IN | WEIGHT: 175 LBS | SYSTOLIC BLOOD PRESSURE: 129 MMHG | DIASTOLIC BLOOD PRESSURE: 76 MMHG | OXYGEN SATURATION: 95 % | HEART RATE: 96 BPM | BODY MASS INDEX: 25.92 KG/M2

## 2021-08-24 PROCEDURE — 700102 HCHG RX REV CODE 250 W/ 637 OVERRIDE(OP): Performed by: SURGERY

## 2021-08-24 PROCEDURE — A9270 NON-COVERED ITEM OR SERVICE: HCPCS | Performed by: SURGERY

## 2021-08-24 PROCEDURE — RXMED WILLOW AMBULATORY MEDICATION CHARGE: Performed by: NURSE PRACTITIONER

## 2021-08-24 PROCEDURE — 700102 HCHG RX REV CODE 250 W/ 637 OVERRIDE(OP): Performed by: NURSE PRACTITIONER

## 2021-08-24 PROCEDURE — 700111 HCHG RX REV CODE 636 W/ 250 OVERRIDE (IP): Performed by: SURGERY

## 2021-08-24 PROCEDURE — A9270 NON-COVERED ITEM OR SERVICE: HCPCS | Performed by: NURSE PRACTITIONER

## 2021-08-24 PROCEDURE — 700102 HCHG RX REV CODE 250 W/ 637 OVERRIDE(OP): Performed by: STUDENT IN AN ORGANIZED HEALTH CARE EDUCATION/TRAINING PROGRAM

## 2021-08-24 PROCEDURE — A9270 NON-COVERED ITEM OR SERVICE: HCPCS | Performed by: STUDENT IN AN ORGANIZED HEALTH CARE EDUCATION/TRAINING PROGRAM

## 2021-08-24 RX ADMIN — OXYCODONE HYDROCHLORIDE 10 MG: 10 TABLET ORAL at 07:57

## 2021-08-24 RX ADMIN — FLUOXETINE 20 MG: 20 CAPSULE ORAL at 04:21

## 2021-08-24 RX ADMIN — FERROUS SULFATE TAB 325 MG (65 MG ELEMENTAL FE) 325 MG: 325 (65 FE) TAB at 07:57

## 2021-08-24 RX ADMIN — METAXALONE 800 MG: 800 TABLET ORAL at 04:21

## 2021-08-24 RX ADMIN — ENOXAPARIN SODIUM 30 MG: 30 INJECTION SUBCUTANEOUS at 04:21

## 2021-08-24 ASSESSMENT — ENCOUNTER SYMPTOMS
SENSORY CHANGE: 1
NECK PAIN: 0
VOMITING: 0
NAUSEA: 0
CONSTITUTIONAL NEGATIVE: 1
MYALGIAS: 1
SHORTNESS OF BREATH: 0
BACK PAIN: 0
DIZZINESS: 0
SPEECH CHANGE: 0
EYES NEGATIVE: 1
CARDIOVASCULAR NEGATIVE: 1
TINGLING: 1
HEADACHES: 0
ABDOMINAL PAIN: 0
CONSTIPATION: 0

## 2021-08-24 ASSESSMENT — LIFESTYLE VARIABLES: SUBSTANCE_ABUSE: 0

## 2021-08-24 ASSESSMENT — PAIN DESCRIPTION - PAIN TYPE: TYPE: ACUTE PAIN

## 2021-08-24 NOTE — CARE PLAN
Problem: Pain - Standard  Goal: Alleviation of pain or a reduction in pain to the patient’s comfort goal  Outcome: Progressing    Patient educated on 0-10 pain scale, and non-pharmacological pain control. Encouraged to verbalize and contact staff when in pain.  Administered PRN medication as needed.     Problem: Skin Integrity  Goal: Skin integrity is maintained or improved  Outcome: Progressing    Inspected skin under immobilizer, patient up to wheelchair.      The patient is Stable - Low risk of patient condition declining or worsening    Shift Goals  Clinical Goals: safety  Patient Goals: discharge, laps in wheelchair  Family Goals: to go home    Progress made toward(s) clinical / shift goals:  Patient educated on medication adverse effects, encouraged to use call light for assistance, bed left in lowest position, call light with in reach, 2 bed rails raised.

## 2021-08-24 NOTE — DISCHARGE PLANNING
Meds-to-Beds: Discharge prescription orders listed below delivered to patient's bedside. PRISCILLA Rojo notified. Written information regarding the dispensed prescriptions was provided to the patient including the phone number of the pharmacy to call for any questions. Patient elected to have co-payment billed to patient account.     Current Outpatient Medications   Medication Sig Dispense Refill   • [START ON 8/24/2021] FLUoxetine (PROZAC) 20 MG Cap Take 1 Capsule by mouth every day. 30 Capsule 1   • metaxalone (SKELAXIN) 800 MG Tab Take 1 Tablet by mouth 3 times a day as needed for Muscle Spasms. 15 Tablet 0   • oxyCODONE immediate-release (ROXICODONE) 5 MG Tab Take 0.5-1 Tablets by mouth every four hours as needed for up to 7 days. 30 Tablet 0        Nisa Parker, PharmD

## 2021-08-24 NOTE — DISCHARGE PLANNING
Received Choice form at 0940  Agency/Facility Name: [facility]  Referral sent per Choice form @ 0940    LSW informed
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
## 2021-08-24 NOTE — DISCHARGE PLANNING
Agency/Facility Name: [facility]  Spoke To: Christy  Outcome: Pt accepted. Contacting pt for copay and then delivering DME.    LSW informed
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
## 2021-08-24 NOTE — CARE PLAN
Problem: Knowledge Deficit - Standard  Goal: Patient and family/care givers will demonstrate understanding of plan of care, disease process/condition, diagnostic tests and medications  Outcome: Progressing     Problem: Pain - Standard  Goal: Alleviation of pain or a reduction in pain to the patient’s comfort goal  Outcome: Progressing   The patient is Stable - Low risk of patient condition declining or worsening    Shift Goals  Clinical Goals: safety  Patient Goals: bed bath, laps in the halls  Family Goals: to go home    Progress made toward(s) clinical / shift goals:  Comfort    Patient is not progressing towards the following goals:

## 2021-08-24 NOTE — DISCHARGE PLANNING
Agency/Facility Name: [facility]  Spoke To: Christy  Outcome: Pt DME to be delivered by 1045    LSW informed

## 2021-08-24 NOTE — PROGRESS NOTES
Pt. Is given discharge information, educated about medications, educated about following up with upcoming appointments, No iv, meds to beds medications given to pt. , lovenox medication added today will be picked up by family. Family and pt. Refusing home health.   W/C already delivered  No further questions needed

## 2021-08-24 NOTE — DISCHARGE PLANNING
Agency/Facility Name:  [facility]  Spoke To: fax  Outcome: Pt declined. Behavior/ Mental Illness/ Criminal Record.    LSW informed

## 2021-08-24 NOTE — DISCHARGE INSTRUCTIONS
Discharge Instructions    Discharged to home by car with relative. Discharged via wheelchair, hospital escort: Yes.  Special equipment needed: Wheelchair    Be sure to schedule a follow-up appointment with your primary care doctor or any specialists as instructed.     Discharge Plan:        I understand that a diet low in cholesterol, fat, and sodium is recommended for good health. Unless I have been given specific instructions below for another diet, I accept this instruction as my diet prescription.   Other diet: regular    Special Instructions: Discharge instructions for the Orthopedic Patient  Call or seek medical attention for questions or concerns  - Follow up with Dr. Holt in 1 weeks time  - Follow up with Psychiatry  - Follow up with primary care provider within one weeks time  - Resume regular diet  - Remain non-weight bearing to bilateral lower extremities until cleared by orthopedic surgery  - May take over the counter acetaminophen or ibuprofen as needed for pain  - Continue daily over the counter stool softener while on narcotics  - No operation of machinery or motorized vehicles while under the influence of narcotics  - No alcohol, marijuana or illicit drug use while under the influence of narcotics  - In the event of a narcotic overdose naloxone (Narcan) is available without a prescription from any [facility] or [facility]  - No swimming, hot tubs, baths or wound submersion until cleared by outpatient provider. May shower  - No contact sports, strenuous activities, or heavy lifting until cleared by outpatient provider  - If respiratory decompensation, persistent or worsening pain, or signs or symptoms of infection occur seek medical attention      Follow up with Primary Care Physician within 2 weeks of discharge to home, regarding:  Review of medications and diagnostic testing.  Surveillance for medical complications.  Workup and treatment of osteoporosis, if appropriate.     -Is this a  Hip/Knee/Shoulder Joint Replacement patient? No    -Is this patient being discharged with medication to prevent blood clots?  Yes, Lovenox Enoxaparin injection  What is this medicine?  ENOXAPARIN (ee nox a PA rin) is used after knee, hip, or abdominal surgeries to prevent blood clotting. It is also used to treat existing blood clots in the lungs or in the veins.  This medicine may be used for other purposes; ask your health care provider or pharmacist if you have questions.  COMMON BRAND NAME(S): Lovenox  What should I tell my health care provider before I take this medicine?  They need to know if you have any of these conditions:  · bleeding disorders, hemorrhage, or hemophilia  · infection of the heart or heart valves  · kidney or liver disease  · previous stroke  · prosthetic heart valve  · recent surgery or delivery of a baby  · ulcer in the stomach or intestine, diverticulitis, or other bowel disease  · an unusual or allergic reaction to enoxaparin, heparin, pork or pork products, other medicines, foods, dyes, or preservatives  · pregnant or trying to get pregnant  · breast-feeding  How should I use this medicine?  This medicine is for injection under the skin. It is usually given by a health-care professional. You or a family member may be trained on how to give the injections. If you are to give yourself injections, make sure you understand how to use the syringe, measure the dose if necessary, and give the injection. To avoid bruising, do not rub the site where this medicine has been injected. Do not take your medicine more often than directed. Do not stop taking except on the advice of your doctor or health care professional.  Make sure you receive a puncture-resistant container to dispose of the needles and syringes once you have finished with them. Do not reuse these items. Return the container to your doctor or health care professional for proper disposal.  Talk to your pediatrician regarding the use of  this medicine in children. Special care may be needed.  Overdosage: If you think you have taken too much of this medicine contact a poison control center or emergency room at once.  NOTE: This medicine is only for you. Do not share this medicine with others.  What if I miss a dose?  If you miss a dose, take it as soon as you can. If it is almost time for your next dose, take only that dose. Do not take double or extra doses.  What may interact with this medicine?  · aspirin and aspirin-like medicines  · certain medicines that treat or prevent blood clots  · dipyridamole  · NSAIDs, medicines for pain and inflammation, like ibuprofen or naproxen  This list may not describe all possible interactions. Give your health care provider a list of all the medicines, herbs, non-prescription drugs, or dietary supplements you use. Also tell them if you smoke, drink alcohol, or use illegal drugs. Some items may interact with your medicine.  What should I watch for while using this medicine?  Visit your healthcare professional for regular checks on your progress. You may need blood work done while you are taking this medicine. Your condition will be monitored carefully while you are receiving this medicine. It is important not to miss any appointments.  If you are going to need surgery or other procedure, tell your healthcare professional that you are using this medicine.  Using this medicine for a long time may weaken your bones and increase the risk of bone fractures.  Avoid sports and activities that might cause injury while you are using this medicine. Severe falls or injuries can cause unseen bleeding. Be careful when using sharp tools or knives. Consider using an electric razor. Take special care brushing or flossing your teeth. Report any injuries, bruising, or red spots on the skin to your healthcare professional.  Wear a medical ID bracelet or chain. Carry a card that describes your disease and details of your medicine  and dosage times.  What side effects may I notice from receiving this medicine?  Side effects that you should report to your doctor or health care professional as soon as possible:  · allergic reactions like skin rash, itching or hives, swelling of the face, lips, or tongue  · bone pain  · signs and symptoms of bleeding such as bloody or black, tarry stools; red or dark-brown urine; spitting up blood or brown material that looks like coffee grounds; red spots on the skin; unusual bruising or bleeding from the eye, gums, or nose  · signs and symptoms of a blood clot such as chest pain; shortness of breath; pain, swelling, or warmth in the leg  · signs and symptoms of a stroke such as changes in vision; confusion; trouble speaking or understanding; severe headaches; sudden numbness or weakness of the face, arm or leg; trouble walking; dizziness; loss of coordination  Side effects that usually do not require medical attention (report to your doctor or health care professional if they continue or are bothersome):  · hair loss  · pain, redness, or irritation at site where injected  This list may not describe all possible side effects. Call your doctor for medical advice about side effects. You may report side effects to FDA at 6-128-FDA-9867.  Where should I keep my medicine?  Keep out of the reach of children.  Store at room temperature between 15 and 30 degrees C (59 and 86 degrees F). Do not freeze. If your injections have been specially prepared, you may need to store them in the refrigerator. Ask your pharmacist. Throw away any unused medicine after the expiration date.  NOTE: This sheet is a summary. It may not cover all possible information. If you have questions about this medicine, talk to your doctor, pharmacist, or health care provider.  © 2020 [employer] (2018-12-13 11:25:34)      · Is patient discharged on Warfarin / Coumadin?   No     Depression / Suicide Risk    As you are discharged from this  [facility] facility, it is important to learn how to keep safe from harming yourself.    Recognize the warning signs:  · Abrupt changes in personality, positive or negative- including increase in energy   · Giving away possessions  · Change in eating patterns- significant weight changes-  positive or negative  · Change in sleeping patterns- unable to sleep or sleeping all the time   · Unwillingness or inability to communicate  · Depression  · Unusual sadness, discouragement and loneliness  · Talk of wanting to die  · Neglect of personal appearance   · Rebelliousness- reckless behavior  · Withdrawal from people/activities they love  · Confusion- inability to concentrate     If you or a loved one observes any of these behaviors or has concerns about self-harm, here's what you can do:  · Talk about it- your feelings and reasons for harming yourself  · Remove any means that you might use to hurt yourself (examples: pills, rope, extension cords, firearm)  · Get professional help from the community (Mental Health, Substance Abuse, psychological counseling)  · Do not be alone:Call your Safe Contact- someone whom you trust who will be there for you.  · Call your local CRISIS HOTLINE 725-0532 or 322-079-8051  · Call your local Children's Mobile Crisis Response Team [place] (631) 189-3097 or www.[employer]  · Call the toll free National Suicide Prevention Hotlines   · National Suicide Prevention Lifeline 341-941-IWBP (7630)  · National Hope Line Network 800-SUICIDE (793-9384)      Incision Care, Adult  An incision is a cut that a doctor makes in your skin for surgery (for a procedure). Most times, these cuts are closed after surgery. Your cut from surgery may be closed with stitches (sutures), staples, skin glue, or skin tape (adhesive strips). You may need to return to your doctor to have stitches or staples taken out. This may happen many days or many weeks after your surgery. The cut needs to be well cared  for so it does not get infected.  How to care for your cut  Cut care    · Follow instructions from your doctor about how to take care of your cut. Make sure you:  ? Wash your hands with soap and water before you change your bandage (dressing). If you cannot use soap and water, use hand .  ? Change your bandage as told by your doctor.  ? Leave stitches, skin glue, or skin tape in place. They may need to stay in place for 2 weeks or longer. If tape strips get loose and curl up, you may trim the loose edges. Do not remove tape strips completely unless your doctor says it is okay.  · Check your cut area every day for signs of infection. Check for:  ? More redness, swelling, or pain.  ? More fluid or blood.  ? Warmth.  ? Pus or a bad smell.  · Ask your doctor how to clean the cut. This may include:  ? Using mild soap and water.  ? Using a clean towel to pat the cut dry after you clean it.  ? Putting a cream or ointment on the cut. Do this only as told by your doctor.  ? Covering the cut with a clean bandage.  · Ask your doctor when you can leave the cut uncovered.  · Do not take baths, swim, or use a hot tub until your doctor says it is okay. Ask your doctor if you can take showers. You may only be allowed to take sponge baths for bathing.  Medicines  · If you were prescribed an antibiotic medicine, cream, or ointment, take the antibiotic or put it on the cut as told by your doctor. Do not stop taking or putting on the antibiotic even if your condition gets better.  · Take over-the-counter and prescription medicines only as told by your doctor.  General instructions  · Limit movement around your cut. This helps healing.  ? Avoid straining, lifting, or exercise for the first month, or for as long as told by your doctor.  ? Follow instructions from your doctor about going back to your normal activities.  ? Ask your doctor what activities are safe.  · Protect your cut from the sun when you are outside for the first  6 months, or for as long as told by your doctor. Put on sunscreen around the scar or cover up the scar.  · Keep all follow-up visits as told by your doctor. This is important.  Contact a doctor if:  · Your have more redness, swelling, or pain around the cut.  · You have more fluid or blood coming from the cut.  · Your cut feels warm to the touch.  · You have pus or a bad smell coming from the cut.  · You have a fever or shaking chills.  · You feel sick to your stomach (nauseous) or you throw up (vomit).  · You are dizzy.  · Your stitches or staples come undone.  Get help right away if:  · You have a red streak coming from your cut.  · Your cut bleeds through the bandage and the bleeding does not stop with gentle pressure.  · The edges of your cut open up and separate.  · You have very bad (severe) pain.  · You have a rash.  · You are confused.  · You pass out (faint).  · You have trouble breathing and you have a fast heartbeat.  This information is not intended to replace advice given to you by your health care provider. Make sure you discuss any questions you have with your health care provider.  Document Released: 03/11/2013 Document Revised: 05/07/2018 Document Reviewed: 08/25/2017  [email or web address] Patient Education © 2020 [email or web address]  Open Reduction, Internal Fixation (ORIF), Generic  Usually, if bones are broken (fractured) and are out of place, unstable, or may become out of place, surgery is needed. This surgery is called an open reduction and internal fixation (ORIF). Open reduction means that the area of the fracture is opened up so the surgeon can see it. Internal fixation means that screws, pins, or fixation devices are used to hold the bone pieces in place.  LET YOUR CAREGIVER KNOW ABOUT:   · Allergies.  · Medicines taken, including herbs, eyedrops, over-the-counter medicines, and creams.  · Use of steroids (by mouth or creams).  · Previous problems with anesthetics or numbing medicines.  · History of  bleeding or blood problems.  · History of blood clots.  · Possibility of pregnancy, if this applies.  · Previous surgery.  · Other health problems.  RISKS AND COMPLICATIONS   All surgery is associated with risks. Some of these risks are:  · Excessive bleeding.  · Infection.  · Imperfect results with loss of joint function.  BEFORE THE PROCEDURE   Usually, surgery is performed shortly after the injury. It is important to provide information to your caregiver after your injury.  AFTER THE PROCEDURE   After surgery, you will be taken to a recovery area where a nurse will monitor your progress. You may have a long, narrow tube(catheter) in the bladder following surgery that helps you pass your water. When awake, stable, taking fluids well, and without complications, you will be returned to your room. You will receive physical therapy and other care. Physical therapy is done until you are doing well and your caregiver feels it is safe for you to go home or to an extended care facility.  Following surgery, the bones may be protected with a cast. The type of casting depends on where the fracture was. Casts are generally left in place for about 5 to 6 weeks. During this time, your caregiver will follow your progress. X-rays may be taken during healing to make sure the bones stay in place.  HOME CARE INSTRUCTIONS   · You or your child may resume normal diet and activities as directed or allowed.  · Put ice on the injured area.  · Put ice in a plastic bag.  · Place a towel between the skin and the bag.  · Leave the ice on for 15-20 minutes at a time, 3-4 times a day, for the first 2 days following surgery.  · Change bandages (dressings) if necessary or as directed.  · If given a plaster or fiberglass cast:  · Do not try to scratch the skin under the cast using sharp or pointed objects.  · Check the skin around the cast every day. You may put lotion on any red or sore areas.  · Keep the cast dry and clean.  · Do not put  pressure on any part of the cast or splint until it is fully hardened.  · The cast or splint can be protected during bathing with a plastic bag. Do not lower the cast or splint into water.  · Only take over-the-counter or prescription medicines for pain, discomfort, or fever as directed by your caregiver.  · Use crutches as directed and do not exercise the leg unless instructed.  · If the bones get out of position (displaced), it may eventually lead to arthritis and lasting disability. Problems can follow even the best of care. Follow the directions of your caregiver.  · Follow all instructions given by your caregiver, make and keep follow-up appointments, and use crutches as directed.  SEEK IMMEDIATE MEDICAL CARE IF:   · There is redness, swelling, numbness, or increasing pain in the wound.  · There is pus coming from the wound.  · You or your child has an oral temperature above 102° F (38.9° C), not controlled by medicine.  · A bad smell is coming from the wound or dressing.  · The wound breaks open (edges not staying together) after stitches (sutures) or staples have been removed.  · The skin or nails below the injury turn blue or gray, or feel cold or numb.  · There is severe pain under the cast or in the foot.  If there is not a window in the cast for observing the wound, a discharge or minor bleeding may show up as a stain on the outside of the cast. Report these findings to your caregiver.  MAKE SURE YOU:   · Understand these instructions.  · Will watch your condition.  · Will get help right away if you are not doing well or gets worse.  Document Released: 12/29/2007 Document Revised: 03/11/2013 Document Reviewed: 12/05/2008  [employer]® Patient Information ©2014 [employer]    Surgical Wound Debridement, Care After  This sheet gives you information about how to care for yourself after your procedure. Your health care provider may also give you more specific instructions. If you have problems or questions,  contact your health care provider.  What can I expect after the procedure?  After the procedure, it is common to have:  · Pain or soreness.  · Fluid that leaks from the wound.  · Stiffness.  · A larger wound. This is because the dead tissue has been removed.  Follow these instructions at home:  Medicines  · Take over-the-counter and prescription medicines only as told by your health care provider.  · If you were prescribed an antibiotic medicine, take it or apply it as told by your health care provider. Do not stop using the antibiotic even if you start to feel better.  Wound care  · Follow instructions from your health care provider about how to take care of your wound. Make sure you:  ? Wash your hands with soap and water before and after you change your bandage (dressing). If soap and water are not available, use hand .  ? Change your dressing as told by your health care provider.  ? If your dressing is dry and stuck when you try to remove it, moisten or wet the dressing with saline or water so that it can be removed without harming your skin or wound tissue.  · Check your wound for signs of infection every time your dressing is changed. Have someone help you do this if you are not able. Watch for:  ? More redness, swelling, or pain.  ? More fluid or blood.  ? Warmth.  ? Pus.  ? A bad smell coming from your wound even after you clean it.  · Do not take baths, swim, or use a hot tub until your health care provider approves. Ask your health care provider if you may take showers. You may only be allowed to take sponge baths.  Activity  · Do not drive or use heavy machinery while taking prescription pain medicine.  · Return to your normal activities as told by your health care provider. Ask your health care provider what activities are safe for you.  General instructions         · Eat a healthy diet with lots of protein such as meats, cheese, nuts and beans. Ask your health care provider to suggest the best  diet for you.  · Do not use any products that contain nicotine or tobacco, such as cigarettes, e-cigarettes, and chewing tobacco. These can delay wound healing after surgery. If you need help quitting, ask your health care provider.  · Keep all follow-up visits as told by your health care provider. This is important.  Contact a health care provider if:  · You have a fever.  · Your pain medicine is not helping.  · Your wound is more red and swollen.  · You have increased bleeding.  · You have pus coming from your wound.  · You have a bad smell coming from your wound.  · Your wound is not getting better after 1-2 weeks of treatment.  · You develop a new medical condition, such as diabetes, peripheral vascular disease, or conditions that affect your defense system (immune system).  Summary  · After the procedure, it is common to have pain, soreness, stiffness, or fluid leaking from the wound.  · Follow instructions from your health care provider about how to take care of your wound.  · Check your wound for signs of infection every time your dressing is changed.  · Eat a healthy diet with lots of protein. Ask your health care provider to suggest the best diet for you.  · Contact a health care provider if you have fever, more swelling and redness, increased bleeding, or a bad smell from the wound.  This information is not intended to replace advice given to you by your health care provider. Make sure you discuss any questions you have with your health care provider.  Document Released: 12/04/2013 Document Revised: 12/10/2019 Document Reviewed: 12/10/2019    Enoxaparin injection  What is this medicine?  ENOXAPARIN (ee nox a PA rin) is used after knee, hip, or abdominal surgeries to prevent blood clotting. It is also used to treat existing blood clots in the lungs or in the veins.  This medicine may be used for other purposes; ask your health care provider or pharmacist if you have questions.  COMMON BRAND NAME(S):  Lovenox  What should I tell my health care provider before I take this medicine?  They need to know if you have any of these conditions:  · bleeding disorders, hemorrhage, or hemophilia  · infection of the heart or heart valves  · kidney or liver disease  · previous stroke  · prosthetic heart valve  · recent surgery or delivery of a baby  · ulcer in the stomach or intestine, diverticulitis, or other bowel disease  · an unusual or allergic reaction to enoxaparin, heparin, pork or pork products, other medicines, foods, dyes, or preservatives  · pregnant or trying to get pregnant  · breast-feeding  How should I use this medicine?  This medicine is for injection under the skin. It is usually given by a health-care professional. You or a family member may be trained on how to give the injections. If you are to give yourself injections, make sure you understand how to use the syringe, measure the dose if necessary, and give the injection. To avoid bruising, do not rub the site where this medicine has been injected. Do not take your medicine more often than directed. Do not stop taking except on the advice of your doctor or health care professional.  Make sure you receive a puncture-resistant container to dispose of the needles and syringes once you have finished with them. Do not reuse these items. Return the container to your doctor or health care professional for proper disposal.  Talk to your pediatrician regarding the use of this medicine in children. Special care may be needed.  Overdosage: If you think you have taken too much of this medicine contact a poison control center or emergency room at once.  NOTE: This medicine is only for you. Do not share this medicine with others.  What if I miss a dose?  If you miss a dose, take it as soon as you can. If it is almost time for your next dose, take only that dose. Do not take double or extra doses.  What may interact with this medicine?  · aspirin and aspirin-like  medicines  · certain medicines that treat or prevent blood clots  · dipyridamole  · NSAIDs, medicines for pain and inflammation, like ibuprofen or naproxen  This list may not describe all possible interactions. Give your health care provider a list of all the medicines, herbs, non-prescription drugs, or dietary supplements you use. Also tell them if you smoke, drink alcohol, or use illegal drugs. Some items may interact with your medicine.  What should I watch for while using this medicine?  Visit your healthcare professional for regular checks on your progress. You may need blood work done while you are taking this medicine. Your condition will be monitored carefully while you are receiving this medicine. It is important not to miss any appointments.  If you are going to need surgery or other procedure, tell your healthcare professional that you are using this medicine.  Using this medicine for a long time may weaken your bones and increase the risk of bone fractures.  Avoid sports and activities that might cause injury while you are using this medicine. Severe falls or injuries can cause unseen bleeding. Be careful when using sharp tools or knives. Consider using an electric razor. Take special care brushing or flossing your teeth. Report any injuries, bruising, or red spots on the skin to your healthcare professional.  Wear a medical ID bracelet or chain. Carry a card that describes your disease and details of your medicine and dosage times.  What side effects may I notice from receiving this medicine?  Side effects that you should report to your doctor or health care professional as soon as possible:  · allergic reactions like skin rash, itching or hives, swelling of the face, lips, or tongue  · bone pain  · signs and symptoms of bleeding such as bloody or black, tarry stools; red or dark-brown urine; spitting up blood or brown material that looks like coffee grounds; red spots on the skin; unusual bruising or  bleeding from the eye, gums, or nose  · signs and symptoms of a blood clot such as chest pain; shortness of breath; pain, swelling, or warmth in the leg  · signs and symptoms of a stroke such as changes in vision; confusion; trouble speaking or understanding; severe headaches; sudden numbness or weakness of the face, arm or leg; trouble walking; dizziness; loss of coordination  Side effects that usually do not require medical attention (report to your doctor or health care professional if they continue or are bothersome):  · hair loss  · pain, redness, or irritation at site where injected  This list may not describe all possible side effects. Call your doctor for medical advice about side effects. You may report side effects to FDA at 3-640-OCV-4817.  Where should I keep my medicine?  Keep out of the reach of children.  Store at room temperature between 15 and 30 degrees C (59 and 86 degrees F). Do not freeze. If your injections have been specially prepared, you may need to store them in the refrigerator. Ask your pharmacist. Throw away any unused medicine after the expiration date.  NOTE: This sheet is a summary. It may not cover all possible information. If you have questions about this medicine, talk to your doctor, pharmacist, or health care provider.  © 2020 [employer] (2018-12-13 11:25:34)    [employer]vier Patient Education © 2020 [employer]

## 2021-08-24 NOTE — DISCHARGE PLANNING
Anticipated Discharge Disposition: Home with HHC and DME-WC    Action: Pt anticipated to DC today pending WC delivery. Per trauma NP Melissa pt is okay to DC prior to verifying HHC acceptance. LSW made phone call to [facility] at [facility] [phone] to inform her that legal hold was discontinued. LSW made phone call to insurance [employer]. Liseth requested that this LSW fax her the orders and face to face for HHC and DME as well as information of the HHC and DME company that referrals were sent to. LSW faxed above information to fax # [phone].    Addendum @[phone]  LSW attempted to get PA for Lovenox through cover my meds. Unable to find pt in system. LSW made phone call to insurance ANDREA Child[employer] who reported she is not able to help with PA. LSW made phone call to pt's insurance phone listed on face sheet. Was informed to call [phone]. LSW called above number and was unable to be connected for PA. Per Teddy with meds to beds, Lovenox can be added to the hospital bill and will be submitted to insurance once billed.    OCTAVIANO Leung notified this LSW that Jorge approved the wheelchair and will be reaching out to the pt.    Barriers to Discharge: DME delivery.    Plan: Care coordination will follow up with HHC and DME referrals.

## (undated) DEVICE — DRAPE STRLE REG TOWEL 18X24 - (10/BX 4BX/CA)"

## (undated) DEVICE — GOWN SURGICAL XX-LARGE - (28EA/CA) SIRUS NON REINFORCED

## (undated) DEVICE — DRAPE C ARMOR (12EA/CA)

## (undated) DEVICE — GLOVE BIOGEL PI INDICATOR SZ 8.0 SURGICAL PF LF -(50/BX 4BX/CA)

## (undated) DEVICE — HANDPIECE 10FT INTPLS SCT PLS IRRIGATION HAND CONTROL SET (6/PK)

## (undated) DEVICE — KIT EVACUATER 3 SPRING PVC LF 1/8 DRAIN SIZE (10EA/CA)"

## (undated) DEVICE — KIT ANESTHESIA W/CIRCUIT & 3/LT BAG W/FILTER (20EA/CA)

## (undated) DEVICE — CHLORAPREP 26 ML APPLICATOR - ORANGE TINT(25/CA)

## (undated) DEVICE — GLOVE BIOGEL PI ORTHO SZ 7.5 PF LF (40PR/BX)

## (undated) DEVICE — PAD LAP STERILE 18 X 18 - (5/PK 40PK/CA)

## (undated) DEVICE — WATER IRRIGATION STERILE 1000ML (12EA/CA)

## (undated) DEVICE — TUBING CLEARLINK DUO-VENT - C-FLO (48EA/CA)

## (undated) DEVICE — SUTURE 0 VICRYL PLUS CTX - 36 INCH (36/BX)

## (undated) DEVICE — SYRINGE EAR/NOSE 3 OZ STERILE (50/CA

## (undated) DEVICE — BANDAGE ROLL STERILE BULKEE 4.5 IN X 4 YD (100EA/CA)

## (undated) DEVICE — DRAPE 36X28IN RAD CARM BND BG - (25/CA) O

## (undated) DEVICE — TUBE CONNECT SUCTION CLEAR 120 X 1/4" (50EA/CA)"

## (undated) DEVICE — DRAPESURG STERI-DRAPE LONG - (10/BX 4BX/CA)

## (undated) DEVICE — GLOVE BIOGEL SZ 7.5 SURGICAL PF LTX - (50PR/BX 4BX/CA)

## (undated) DEVICE — SODIUM CHL IRRIGATION 0.9% 1000ML (12EA/CA)

## (undated) DEVICE — BLANKET WARMING UPPER BODY - (10/CA)

## (undated) DEVICE — DRILL BIT 3.5 TWIST 310.37 (5TX2+2TX1=12)

## (undated) DEVICE — DRAPE IOBAN II 23 IN X 33 IN - (10/BX)

## (undated) DEVICE — SLEEVE, VASO, THIGH, MED

## (undated) DEVICE — LACTATED RINGERS INJ 1000 ML - (14EA/CA 60CA/PF)

## (undated) DEVICE — SENSOR SPO2 NEO LNCS ADHESIVE (20/BX) SEE USER NOTES

## (undated) DEVICE — GLOVE BIOGEL SZ 6 PF LATEX - (50EA/BX 4BX/CA)

## (undated) DEVICE — TOWELS CLOTH SURGICAL - (4/PK 20PK/CA)

## (undated) DEVICE — GOWN WARMING STANDARD FLEX - (30/CA)

## (undated) DEVICE — Device

## (undated) DEVICE — ELECTRODE 850 FOAM ADHESIVE - HYDROGEL RADIOTRNSPRNT (50/PK)

## (undated) DEVICE — DRAPE LARGE 3 QUARTER - (20/CA)

## (undated) DEVICE — DRAPE SURGICAL U 77X120 - (10/CA)

## (undated) DEVICE — BANDAGE ELASTIC 6 HONEYCOMB - 6X5YD LF (20/CA)"

## (undated) DEVICE — TIP INTPLS HFLO ML ORFC BTRY - (12/CS)  FOR SURGILAV

## (undated) DEVICE — STAPLER SKIN DISP - (6/BX 10BX/CA) VISISTAT

## (undated) DEVICE — HEAD HOLDER JUNIOR/ADULT

## (undated) DEVICE — GLOVE SZ 8 BIOGEL PI MICRO - PF LF (50PR/BX)

## (undated) DEVICE — DRAPE SURG STERI-DRAPE 7X11OD - (40EA/CA)

## (undated) DEVICE — MASK ANESTHESIA ADULT  - (100/CA)

## (undated) DEVICE — SET EXTENSION WITH 2 PORTS (48EA/CA) ***PART #2C8610 IS A SUBSTITUTE*****

## (undated) DEVICE — SUTURE 2-0 VICRYL PLUS CT-1 36 (36PK/BX)"

## (undated) DEVICE — CANISTER SUCTION 3000ML MECHANICAL FILTER AUTO SHUTOFF MEDI-VAC NONSTERILE LF DISP  (40EA/CA)

## (undated) DEVICE — GOWN SURGEONS X-LARGE - DISP. (30/CA)

## (undated) DEVICE — SUCTION INSTRUMENT YANKAUER OPEN TIP W/O VENT (50EA/CA)

## (undated) DEVICE — NEPTUNE 4 PORT MANIFOLD - (20/PK)

## (undated) DEVICE — PACK TOTAL HIP - (1/CA)

## (undated) DEVICE — SODIUM CHL. IRRIGATION 0.9% 3000ML (4EA/CA 65CA/PF)

## (undated) DEVICE — SUTURE 2-0 VICRYL PLUS CTX - 36 INCH (36/BX)

## (undated) DEVICE — TOWEL STOP TIMEOUT SAFETY FLAG (40EA/CA)

## (undated) DEVICE — GLOVE BIOGEL INDICATOR SZ 7.5 SURGICAL PF LTX - (50PR/BX 4BX/CA)

## (undated) DEVICE — DRESSING TRANSPARENT FILM TEGADERM 4 X 4.75" (50EA/BX)"

## (undated) DEVICE — SUTURE GENERAL

## (undated) DEVICE — SET LEADWIRE 5 LEAD BEDSIDE DISPOSABLE ECG (1SET OF 5/EA)

## (undated) DEVICE — WRAP COBAN SELF-ADHERENT 6 IN X  5YDS STERILE TAN (12/CA)

## (undated) DEVICE — DRESSING LEUKOMED STERILE 11.75X4IN - (50/CA)

## (undated) DEVICE — PROTECTOR ULNA NERVE - (36PR/CA)

## (undated) DEVICE — SUTURE 5 ETHIBOND V-37 (12PK/BX)

## (undated) DEVICE — DRAPE U ORTHOPEDIC - (10/BX)

## (undated) DEVICE — SPONGE GAUZESTER 4 X 4 4PLY - (128PK/CA)

## (undated) DEVICE — SUTURE 0 VICRYL PLUS CT-1 - 36 INCH (36/BX)

## (undated) DEVICE — SUCTION INSTRUMENT YANKAUER BULBOUS TIP W/O VENT (50EA/CA)